# Patient Record
Sex: FEMALE | Race: WHITE | HISPANIC OR LATINO
[De-identification: names, ages, dates, MRNs, and addresses within clinical notes are randomized per-mention and may not be internally consistent; named-entity substitution may affect disease eponyms.]

---

## 2017-08-11 ENCOUNTER — RECORD ABSTRACTING (OUTPATIENT)
Age: 27
End: 2017-08-11

## 2017-08-11 DIAGNOSIS — Z36 ENCOUNTER FOR ANTENATAL SCREENING OF MOTHER: ICD-10-CM

## 2017-08-11 PROBLEM — Z00.00 ENCOUNTER FOR PREVENTIVE HEALTH EXAMINATION: Status: ACTIVE | Noted: 2017-08-11

## 2017-08-18 ENCOUNTER — APPOINTMENT (OUTPATIENT)
Dept: ANTEPARTUM | Facility: CLINIC | Age: 27
End: 2017-08-18
Payer: COMMERCIAL

## 2017-08-18 ENCOUNTER — ASOB RESULT (OUTPATIENT)
Age: 27
End: 2017-08-18

## 2017-08-18 PROCEDURE — 76817 TRANSVAGINAL US OBSTETRIC: CPT

## 2017-08-18 PROCEDURE — 76811 OB US DETAILED SNGL FETUS: CPT

## 2017-09-11 ENCOUNTER — TRANSCRIPTION ENCOUNTER (OUTPATIENT)
Age: 27
End: 2017-09-11

## 2017-11-14 ENCOUNTER — APPOINTMENT (OUTPATIENT)
Dept: ANTEPARTUM | Facility: CLINIC | Age: 27
End: 2017-11-14

## 2017-11-21 ENCOUNTER — ASOB RESULT (OUTPATIENT)
Age: 27
End: 2017-11-21

## 2017-11-21 ENCOUNTER — APPOINTMENT (OUTPATIENT)
Dept: ANTEPARTUM | Facility: CLINIC | Age: 27
End: 2017-11-21
Payer: COMMERCIAL

## 2017-11-21 PROCEDURE — 76816 OB US FOLLOW-UP PER FETUS: CPT

## 2018-01-04 ENCOUNTER — INPATIENT (INPATIENT)
Facility: HOSPITAL | Age: 28
LOS: 2 days | Discharge: HOME | End: 2018-01-07
Attending: SPECIALIST | Admitting: SPECIALIST
Payer: COMMERCIAL

## 2018-01-04 VITALS — WEIGHT: 189.6 LBS | HEIGHT: 63 IN

## 2018-01-04 DIAGNOSIS — O26.893 OTHER SPECIFIED PREGNANCY RELATED CONDITIONS, THIRD TRIMESTER: ICD-10-CM

## 2018-01-04 DIAGNOSIS — O47.1 FALSE LABOR AT OR AFTER 37 COMPLETED WEEKS OF GESTATION: ICD-10-CM

## 2018-01-04 LAB
APPEARANCE UR: CLEAR — SIGNIFICANT CHANGE UP
BASOPHILS # BLD AUTO: 0 K/UL — SIGNIFICANT CHANGE UP (ref 0–0.2)
BASOPHILS NFR BLD AUTO: 0.1 % — SIGNIFICANT CHANGE UP (ref 0–2)
BILIRUB UR-MCNC: NEGATIVE — SIGNIFICANT CHANGE UP
BLD GP AB SCN SERPL QL: SIGNIFICANT CHANGE UP
COLOR SPEC: SIGNIFICANT CHANGE UP
DIFF PNL FLD: NEGATIVE — SIGNIFICANT CHANGE UP
EOSINOPHIL # BLD AUTO: 0 K/UL — SIGNIFICANT CHANGE UP (ref 0–0.5)
EOSINOPHIL NFR BLD AUTO: 0.3 % — SIGNIFICANT CHANGE UP (ref 0–6)
GLUCOSE UR QL: NEGATIVE MG/DL — SIGNIFICANT CHANGE UP
HCT VFR BLD CALC: 37.2 % — SIGNIFICANT CHANGE UP (ref 37–47)
HGB BLD-MCNC: 12.2 G/DL — SIGNIFICANT CHANGE UP (ref 12–16)
KETONES UR-MCNC: NEGATIVE — SIGNIFICANT CHANGE UP
LEUKOCYTE ESTERASE UR-ACNC: NEGATIVE — SIGNIFICANT CHANGE UP
LYMPHOCYTES # BLD AUTO: 1.8 K/UL — SIGNIFICANT CHANGE UP (ref 1–4.8)
LYMPHOCYTES # BLD AUTO: 13.4 % — LOW (ref 20–55)
MCHC RBC-ENTMCNC: 28.8 PG — SIGNIFICANT CHANGE UP (ref 27–31)
MCHC RBC-ENTMCNC: 32.8 G/DL — SIGNIFICANT CHANGE UP (ref 32–36)
MCV RBC AUTO: 87.9 FL — SIGNIFICANT CHANGE UP (ref 81–99)
MONOCYTES # BLD AUTO: 1.3 K/UL — HIGH (ref 0–0.8)
MONOCYTES NFR BLD AUTO: 9.3 % — SIGNIFICANT CHANGE UP (ref 3–10)
NEUTROPHILS # BLD AUTO: 10.3 K/UL — HIGH (ref 1.8–8)
NEUTROPHILS NFR BLD AUTO: 76.1 % — HIGH (ref 37–73)
NITRITE UR-MCNC: NEGATIVE — SIGNIFICANT CHANGE UP
PH UR: 8 — SIGNIFICANT CHANGE UP (ref 5–8)
PLATELET # BLD AUTO: 259 K/UL — SIGNIFICANT CHANGE UP (ref 150–400)
PROT UR-MCNC: NEGATIVE MG/DL — SIGNIFICANT CHANGE UP
RBC # BLD: 4.23 M/UL — LOW (ref 4.4–5.2)
RBC # FLD: 13.6 % — SIGNIFICANT CHANGE UP (ref 11–15.6)
SP GR SPEC: 1.01 — SIGNIFICANT CHANGE UP (ref 1.01–1.02)
TYPE + AB SCN PNL BLD: SIGNIFICANT CHANGE UP
UROBILINOGEN FLD QL: NEGATIVE MG/DL — SIGNIFICANT CHANGE UP
WBC # BLD: 13.5 K/UL — HIGH (ref 4.8–10.8)
WBC # FLD AUTO: 13.5 K/UL — HIGH (ref 4.8–10.8)

## 2018-01-04 RX ORDER — PENICILLIN G POTASSIUM 5000000 [IU]/1
POWDER, FOR SOLUTION INTRAMUSCULAR; INTRAPLEURAL; INTRATHECAL; INTRAVENOUS
Qty: 0 | Refills: 0 | Status: DISCONTINUED | OUTPATIENT
Start: 2018-01-04 | End: 2018-01-05

## 2018-01-04 RX ORDER — SODIUM CHLORIDE 9 MG/ML
1000 INJECTION, SOLUTION INTRAVENOUS
Qty: 0 | Refills: 0 | Status: DISCONTINUED | OUTPATIENT
Start: 2018-01-04 | End: 2018-01-05

## 2018-01-04 RX ORDER — PENICILLIN G POTASSIUM 5000000 [IU]/1
5 POWDER, FOR SOLUTION INTRAMUSCULAR; INTRAPLEURAL; INTRATHECAL; INTRAVENOUS ONCE
Qty: 0 | Refills: 0 | Status: COMPLETED | OUTPATIENT
Start: 2018-01-04 | End: 2018-01-04

## 2018-01-04 RX ORDER — CITRIC ACID/SODIUM CITRATE 300-500 MG
30 SOLUTION, ORAL ORAL ONCE
Qty: 0 | Refills: 0 | Status: COMPLETED | OUTPATIENT
Start: 2018-01-04 | End: 2018-01-04

## 2018-01-04 RX ORDER — OXYTOCIN 10 UNIT/ML
333.33 VIAL (ML) INJECTION
Qty: 20 | Refills: 0 | Status: COMPLETED | OUTPATIENT
Start: 2018-01-04

## 2018-01-04 RX ORDER — PENICILLIN G POTASSIUM 5000000 [IU]/1
2.5 POWDER, FOR SOLUTION INTRAMUSCULAR; INTRAPLEURAL; INTRATHECAL; INTRAVENOUS EVERY 4 HOURS
Qty: 0 | Refills: 0 | Status: DISCONTINUED | OUTPATIENT
Start: 2018-01-04 | End: 2018-01-05

## 2018-01-04 RX ORDER — SODIUM CHLORIDE 9 MG/ML
1000 INJECTION, SOLUTION INTRAVENOUS ONCE
Qty: 0 | Refills: 0 | Status: COMPLETED | OUTPATIENT
Start: 2018-01-04 | End: 2018-01-04

## 2018-01-04 RX ADMIN — SODIUM CHLORIDE 125 MILLILITER(S): 9 INJECTION, SOLUTION INTRAVENOUS at 23:46

## 2018-01-04 RX ADMIN — PENICILLIN G POTASSIUM 200 MILLION UNIT(S): 5000000 POWDER, FOR SOLUTION INTRAMUSCULAR; INTRAPLEURAL; INTRATHECAL; INTRAVENOUS at 22:45

## 2018-01-04 RX ADMIN — SODIUM CHLORIDE 125 MILLILITER(S): 9 INJECTION, SOLUTION INTRAVENOUS at 18:45

## 2018-01-04 RX ADMIN — PENICILLIN G POTASSIUM 200 MILLION UNIT(S): 5000000 POWDER, FOR SOLUTION INTRAMUSCULAR; INTRAPLEURAL; INTRATHECAL; INTRAVENOUS at 18:45

## 2018-01-04 RX ADMIN — SODIUM CHLORIDE 2000 MILLILITER(S): 9 INJECTION, SOLUTION INTRAVENOUS at 17:45

## 2018-01-04 RX ADMIN — Medication 30 MILLILITER(S): at 19:35

## 2018-01-05 ENCOUNTER — TRANSCRIPTION ENCOUNTER (OUTPATIENT)
Age: 28
End: 2018-01-05

## 2018-01-05 LAB
BASE EXCESS BLDCOA CALC-SCNC: -2 MMOL/L — SIGNIFICANT CHANGE UP (ref -2–2)
BASE EXCESS BLDCOV CALC-SCNC: -0.1 MMOL/L — SIGNIFICANT CHANGE UP (ref -2–2)
BASOPHILS # BLD AUTO: 0 K/UL — SIGNIFICANT CHANGE UP (ref 0–0.2)
BASOPHILS NFR BLD AUTO: 0.1 % — SIGNIFICANT CHANGE UP (ref 0–2)
EOSINOPHIL # BLD AUTO: 0 K/UL — SIGNIFICANT CHANGE UP (ref 0–0.5)
EOSINOPHIL NFR BLD AUTO: 0.4 % — SIGNIFICANT CHANGE UP (ref 0–6)
GAS PNL BLDCOV: 7.37 — SIGNIFICANT CHANGE UP (ref 7.25–7.45)
HCO3 BLDCOA-SCNC: 21 MMOL/L — SIGNIFICANT CHANGE UP (ref 21–29)
HCO3 BLDCOV-SCNC: 24 MMOL/L — SIGNIFICANT CHANGE UP (ref 21–29)
HCT VFR BLD CALC: 33.7 % — LOW (ref 37–47)
HGB BLD-MCNC: 10.6 G/DL — LOW (ref 12–16)
LYMPHOCYTES # BLD AUTO: 1.6 K/UL — SIGNIFICANT CHANGE UP (ref 1–4.8)
LYMPHOCYTES # BLD AUTO: 12.8 % — LOW (ref 20–55)
MCHC RBC-ENTMCNC: 28.6 PG — SIGNIFICANT CHANGE UP (ref 27–31)
MCHC RBC-ENTMCNC: 31.5 G/DL — LOW (ref 32–36)
MCV RBC AUTO: 91.1 FL — SIGNIFICANT CHANGE UP (ref 81–99)
MONOCYTES # BLD AUTO: 1.6 K/UL — HIGH (ref 0–0.8)
MONOCYTES NFR BLD AUTO: 12.3 % — HIGH (ref 3–10)
NEUTROPHILS # BLD AUTO: 9.4 K/UL — HIGH (ref 1.8–8)
NEUTROPHILS NFR BLD AUTO: 73.8 % — HIGH (ref 37–73)
PCO2 BLDCOA: 50.4 MMHG — SIGNIFICANT CHANGE UP (ref 32–68)
PCO2 BLDCOV: 43.6 MMHG — SIGNIFICANT CHANGE UP (ref 29–53)
PH BLDCOA: 7.3 — SIGNIFICANT CHANGE UP (ref 7.18–7.38)
PLATELET # BLD AUTO: 214 K/UL — SIGNIFICANT CHANGE UP (ref 150–400)
PO2 BLDCOA: 16.8 MMHG — SIGNIFICANT CHANGE UP (ref 5.7–30.5)
PO2 BLDCOA: 28.3 MMHG — SIGNIFICANT CHANGE UP (ref 17–41)
RBC # BLD: 3.7 M/UL — LOW (ref 4.4–5.2)
RBC # FLD: 13.9 % — SIGNIFICANT CHANGE UP (ref 11–15.6)
SAO2 % BLDCOA: SIGNIFICANT CHANGE UP
SAO2 % BLDCOV: SIGNIFICANT CHANGE UP
T PALLIDUM AB TITR SER: NEGATIVE — SIGNIFICANT CHANGE UP
WBC # BLD: 12.7 K/UL — HIGH (ref 4.8–10.8)
WBC # FLD AUTO: 12.7 K/UL — HIGH (ref 4.8–10.8)

## 2018-01-05 RX ORDER — LANOLIN
1 OINTMENT (GRAM) TOPICAL EVERY 6 HOURS
Qty: 0 | Refills: 0 | Status: DISCONTINUED | OUTPATIENT
Start: 2018-01-05 | End: 2018-01-07

## 2018-01-05 RX ORDER — ACETAMINOPHEN 500 MG
650 TABLET ORAL EVERY 6 HOURS
Qty: 0 | Refills: 0 | Status: DISCONTINUED | OUTPATIENT
Start: 2018-01-05 | End: 2018-01-07

## 2018-01-05 RX ORDER — SODIUM CHLORIDE 9 MG/ML
3 INJECTION INTRAMUSCULAR; INTRAVENOUS; SUBCUTANEOUS EVERY 8 HOURS
Qty: 0 | Refills: 0 | Status: DISCONTINUED | OUTPATIENT
Start: 2018-01-05 | End: 2018-01-07

## 2018-01-05 RX ORDER — PRAMOXINE HYDROCHLORIDE 150 MG/15G
1 AEROSOL, FOAM RECTAL EVERY 4 HOURS
Qty: 0 | Refills: 0 | Status: DISCONTINUED | OUTPATIENT
Start: 2018-01-05 | End: 2018-01-07

## 2018-01-05 RX ORDER — SIMETHICONE 80 MG/1
80 TABLET, CHEWABLE ORAL EVERY 6 HOURS
Qty: 0 | Refills: 0 | Status: DISCONTINUED | OUTPATIENT
Start: 2018-01-05 | End: 2018-01-07

## 2018-01-05 RX ORDER — MAGNESIUM HYDROXIDE 400 MG/1
30 TABLET, CHEWABLE ORAL
Qty: 0 | Refills: 0 | Status: DISCONTINUED | OUTPATIENT
Start: 2018-01-05 | End: 2018-01-07

## 2018-01-05 RX ORDER — OXYTOCIN 10 UNIT/ML
41.67 VIAL (ML) INJECTION
Qty: 20 | Refills: 0 | Status: DISCONTINUED | OUTPATIENT
Start: 2018-01-05 | End: 2018-01-07

## 2018-01-05 RX ORDER — GLYCERIN ADULT
1 SUPPOSITORY, RECTAL RECTAL AT BEDTIME
Qty: 0 | Refills: 0 | Status: DISCONTINUED | OUTPATIENT
Start: 2018-01-05 | End: 2018-01-07

## 2018-01-05 RX ORDER — AER TRAVELER 0.5 G/1
1 SOLUTION RECTAL; TOPICAL EVERY 4 HOURS
Qty: 0 | Refills: 0 | Status: DISCONTINUED | OUTPATIENT
Start: 2018-01-05 | End: 2018-01-07

## 2018-01-05 RX ORDER — IBUPROFEN 200 MG
600 TABLET ORAL EVERY 6 HOURS
Qty: 0 | Refills: 0 | Status: DISCONTINUED | OUTPATIENT
Start: 2018-01-05 | End: 2018-01-07

## 2018-01-05 RX ORDER — DOCUSATE SODIUM 100 MG
100 CAPSULE ORAL
Qty: 0 | Refills: 0 | Status: DISCONTINUED | OUTPATIENT
Start: 2018-01-05 | End: 2018-01-07

## 2018-01-05 RX ORDER — HYDROCORTISONE 1 %
1 OINTMENT (GRAM) TOPICAL EVERY 4 HOURS
Qty: 0 | Refills: 0 | Status: DISCONTINUED | OUTPATIENT
Start: 2018-01-05 | End: 2018-01-07

## 2018-01-05 RX ORDER — DIBUCAINE 1 %
1 OINTMENT (GRAM) RECTAL EVERY 4 HOURS
Qty: 0 | Refills: 0 | Status: DISCONTINUED | OUTPATIENT
Start: 2018-01-05 | End: 2018-01-07

## 2018-01-05 RX ORDER — TETANUS TOXOID, REDUCED DIPHTHERIA TOXOID AND ACELLULAR PERTUSSIS VACCINE, ADSORBED 5; 2.5; 8; 8; 2.5 [IU]/.5ML; [IU]/.5ML; UG/.5ML; UG/.5ML; UG/.5ML
0.5 SUSPENSION INTRAMUSCULAR ONCE
Qty: 0 | Refills: 0 | Status: COMPLETED | OUTPATIENT
Start: 2018-01-05

## 2018-01-05 RX ORDER — OXYTOCIN 10 UNIT/ML
333.33 VIAL (ML) INJECTION
Qty: 20 | Refills: 0 | Status: DISCONTINUED | OUTPATIENT
Start: 2018-01-05 | End: 2018-01-07

## 2018-01-05 RX ORDER — DIPHENHYDRAMINE HCL 50 MG
25 CAPSULE ORAL EVERY 6 HOURS
Qty: 0 | Refills: 0 | Status: DISCONTINUED | OUTPATIENT
Start: 2018-01-05 | End: 2018-01-07

## 2018-01-05 RX ORDER — OXYCODONE AND ACETAMINOPHEN 5; 325 MG/1; MG/1
2 TABLET ORAL EVERY 6 HOURS
Qty: 0 | Refills: 0 | Status: DISCONTINUED | OUTPATIENT
Start: 2018-01-05 | End: 2018-01-07

## 2018-01-05 RX ADMIN — OXYCODONE AND ACETAMINOPHEN 2 TABLET(S): 5; 325 TABLET ORAL at 12:38

## 2018-01-05 RX ADMIN — Medication 600 MILLIGRAM(S): at 12:05

## 2018-01-05 RX ADMIN — OXYCODONE AND ACETAMINOPHEN 2 TABLET(S): 5; 325 TABLET ORAL at 13:38

## 2018-01-05 RX ADMIN — Medication 600 MILLIGRAM(S): at 16:53

## 2018-01-05 RX ADMIN — Medication 1000 MILLIUNIT(S)/MIN: at 03:47

## 2018-01-05 RX ADMIN — Medication 600 MILLIGRAM(S): at 04:17

## 2018-01-05 RX ADMIN — OXYCODONE AND ACETAMINOPHEN 2 TABLET(S): 5; 325 TABLET ORAL at 21:50

## 2018-01-05 RX ADMIN — Medication 125 MILLIUNIT(S)/MIN: at 03:47

## 2018-01-05 RX ADMIN — OXYCODONE AND ACETAMINOPHEN 2 TABLET(S): 5; 325 TABLET ORAL at 20:49

## 2018-01-05 RX ADMIN — Medication 600 MILLIGRAM(S): at 11:05

## 2018-01-05 RX ADMIN — Medication 1 TABLET(S): at 11:05

## 2018-01-05 RX ADMIN — Medication 600 MILLIGRAM(S): at 17:53

## 2018-01-05 RX ADMIN — Medication 600 MILLIGRAM(S): at 04:55

## 2018-01-05 NOTE — DISCHARGE NOTE OB - CARE PROVIDER_API CALL
Sheela Isidro (MD), [facility] Ob[facility]etrics [facility] Gyne[facility]  [facility] [place] [address]  Phone: [phone]  Fax: [phone]

## 2018-01-05 NOTE — DISCHARGE NOTE OB - PLAN OF CARE
Rapid recovery Patient should transition to regular activity level. Resume regular diet. Patient should follow up with her OB for a postpartum checkup 6 weeks after delivery. Patient should call her doctor sooner if she develops a fever or uncontrolled vaginal bleeding or fevers. Please call sooner if there are any other concerns.

## 2018-01-05 NOTE — DISCHARGE NOTE OB - PATIENT PORTAL LINK FT
“You can access the FollowHealth Patient Portal, offered by [facility], by registering with the following website: [email or web address]”

## 2018-01-05 NOTE — DISCHARGE NOTE OB - MEDICATION SUMMARY - MEDICATIONS TO TAKE
I will START or STAY ON the medications listed below when I get home from the hospital:  None I will START or STAY ON the medications listed below when I get home from the hospital:    ibuprofen 600 mg oral tablet  -- 1 tab(s) by mouth every 6 hours, As needed, Moderate Pain  -- Indication: For moderate pain    Prenatal Multivitamins with Folic Acid 1 mg oral tablet  -- 1 tab(s) by mouth once a day  -- Indication: For home med

## 2018-01-05 NOTE — DISCHARGE NOTE OB - CARE PLAN
Principal Discharge DX:	 (normal spontaneous vaginal delivery)  Goal:	Rapid recovery  Instructions for follow-up, activity and diet:	Patient should transition to regular activity level. Resume regular diet. Patient should follow up with her OB for a postpartum checkup 6 weeks after delivery. Patient should call her doctor sooner if she develops a fever or uncontrolled vaginal bleeding or fevers. Please call sooner if there are any other concerns.

## 2018-01-06 RX ORDER — TETANUS TOXOID, REDUCED DIPHTHERIA TOXOID AND ACELLULAR PERTUSSIS VACCINE, ADSORBED 5; 2.5; 8; 8; 2.5 [IU]/.5ML; [IU]/.5ML; UG/.5ML; UG/.5ML; UG/.5ML
0.5 SUSPENSION INTRAMUSCULAR ONCE
Qty: 0 | Refills: 0 | Status: COMPLETED | OUTPATIENT
Start: 2018-01-06 | End: 2018-01-06

## 2018-01-06 RX ORDER — IBUPROFEN 200 MG
1 TABLET ORAL
Qty: 0 | Refills: 0 | DISCHARGE
Start: 2018-01-06

## 2018-01-06 RX ADMIN — Medication 600 MILLIGRAM(S): at 10:41

## 2018-01-06 RX ADMIN — OXYCODONE AND ACETAMINOPHEN 2 TABLET(S): 5; 325 TABLET ORAL at 06:00

## 2018-01-06 RX ADMIN — Medication 100 MILLIGRAM(S): at 16:26

## 2018-01-06 RX ADMIN — Medication 600 MILLIGRAM(S): at 16:26

## 2018-01-06 RX ADMIN — Medication 600 MILLIGRAM(S): at 22:55

## 2018-01-06 RX ADMIN — Medication 600 MILLIGRAM(S): at 17:21

## 2018-01-06 RX ADMIN — Medication 1 TABLET(S): at 10:41

## 2018-01-06 RX ADMIN — TETANUS TOXOID, REDUCED DIPHTHERIA TOXOID AND ACELLULAR PERTUSSIS VACCINE, ADSORBED 0.5 MILLILITER(S): 5; 2.5; 8; 8; 2.5 SUSPENSION INTRAMUSCULAR at 20:01

## 2018-01-06 RX ADMIN — Medication 600 MILLIGRAM(S): at 11:26

## 2018-01-06 RX ADMIN — OXYCODONE AND ACETAMINOPHEN 2 TABLET(S): 5; 325 TABLET ORAL at 04:56

## 2018-01-07 VITALS — SYSTOLIC BLOOD PRESSURE: 120 MMHG | RESPIRATION RATE: 22 BRPM | DIASTOLIC BLOOD PRESSURE: 65 MMHG | TEMPERATURE: 98 F

## 2018-01-07 PROCEDURE — 86850 RBC ANTIBODY SCREEN: CPT

## 2018-01-07 PROCEDURE — 86900 BLOOD TYPING SEROLOGIC ABO: CPT

## 2018-01-07 PROCEDURE — 86780 TREPONEMA PALLIDUM: CPT

## 2018-01-07 PROCEDURE — 81003 URINALYSIS AUTO W/O SCOPE: CPT

## 2018-01-07 PROCEDURE — 59025 FETAL NON-STRESS TEST: CPT

## 2018-01-07 PROCEDURE — 82803 BLOOD GASES ANY COMBINATION: CPT

## 2018-01-07 PROCEDURE — 86901 BLOOD TYPING SEROLOGIC RH(D): CPT

## 2018-01-07 PROCEDURE — 59050 FETAL MONITOR W/REPORT: CPT

## 2018-01-07 PROCEDURE — 85027 COMPLETE CBC AUTOMATED: CPT

## 2018-01-07 PROCEDURE — 90715 TDAP VACCINE 7 YRS/> IM: CPT

## 2018-01-07 PROCEDURE — 36415 COLL VENOUS BLD VENIPUNCTURE: CPT

## 2018-01-07 PROCEDURE — G0463: CPT

## 2018-01-07 RX ADMIN — Medication 600 MILLIGRAM(S): at 06:53

## 2018-01-07 RX ADMIN — Medication 600 MILLIGRAM(S): at 00:00

## 2018-01-07 RX ADMIN — Medication 600 MILLIGRAM(S): at 05:26

## 2018-01-07 RX ADMIN — OXYCODONE AND ACETAMINOPHEN 2 TABLET(S): 5; 325 TABLET ORAL at 09:20

## 2018-01-07 RX ADMIN — OXYCODONE AND ACETAMINOPHEN 2 TABLET(S): 5; 325 TABLET ORAL at 08:29

## 2018-01-07 NOTE — PROGRESS NOTE ADULT - ASSESSMENT
A/P:  PPD#1 s/p    -Stable  -Voiding, tolerating PO, bowel function nml   -Advance care as tolerated   -Continue routine postpartum/postoperative care and education.
A/P:  PPD#2 s/p    -Stable, home today   -Voiding, tolerating PO, bowel function nml   -Advance care as tolerated   -Continue routine postpartum/postoperative care and education.

## 2018-01-07 NOTE — PROGRESS NOTE ADULT - SUBJECTIVE AND OBJECTIVE BOX
PPD#1 s/p spontaneous vaginal delivery     S:    The patient has no complaints.  She is ambulating and tolerating diet   + flatus/-BM     O:    T(C): 36.4 (01-05-18 @ 20:50), Max: 36.4 (01-05-18 @ 20:50)  HR: 74 (01-05-18 @ 20:50) (73 - 74)  BP: 102/59 (01-05-18 @ 20:50) (102/59 - 119/78)  RR: 18 (01-05-18 @ 20:50) (18 - 20)  Abdomen:  soft, non-tender, non-distended, +bowel sounds.  Uterus:  Fundus firm below umbilicus  VE:  +lochia  Ext:  Non-tender.                          10.6   12.7  )-----------( 214      ( 05 Jan 2018 18:37 )             33.7
  PPD#2    Pt is now PPD #2 doing well.    She is ambulating without difficulty, tolerating a reg diet, and denies N/V.  No C/O dizziness, no RITO.  +Voiding without difficulty.      On exam, pt appears well.  VSS, afebrile.    Vital Signs Last 24 Hrs  T(C): 36.9 (2018 08:49), Max: 37 (2018 20:33)  T(F): 98.4 (2018 08:49), Max: 98.6 (2018 20:33)  HR: 81 (2018 20:33) (81 - 81)  BP: 120/65 (2018 08:49) (101/55 - 120/65)  RR: 22 (2018 08:49) (20 - 22)    Breasts soft, nontender, nonengorged.  Abdomen: Soft, nontender, nondistended , firm uterine fundus.  Pelvic: Normal lochia noted  Ext: No DVT tenderness    LABS:                        10.6   12.7  )-----------( 214      ( 2018 18:37 )             33.7                     PPD#2 S/P  stable.  Pt recovering well.  Signs and symptoms normal /  abnormal post partum reviewed.  Plan to cont reg diet, OOB,  pelvic rest, Motrin/Percocet prn.  OK for D/C home.  F/U in office in 6 wks.
INTERVAL HPI/OVERNIGHT EVENTS:  27y Female s/p labor epidural on 1/6/18    Vital Signs Last 24 Hrs  T(C): 36.7 (06 Jan 2018 10:07), Max: 36.7 (06 Jan 2018 10:07)  T(F): 98.1 (06 Jan 2018 10:07), Max: 98.1 (06 Jan 2018 10:07)  HR: 62 (06 Jan 2018 10:07) (62 - 74)  BP: 107/59 (06 Jan 2018 10:07) (102/59 - 111/69)  BP(mean): --  RR: 18 (06 Jan 2018 10:07) (18 - 18)  SpO2: --    Patient seen, doing well, no anesthetic complications or complaints noted or reported.
PPD#2 s/p spontaneous vaginal delivery     S:    The patient has no complaints.  She is ambulating and tolerating diet   + flatus/-BM     O:    T(C): 37 (01-06-18 @ 20:33), Max: 37 (01-06-18 @ 20:33)  HR: 81 (01-06-18 @ 20:33) (62 - 81)  BP: 101/55 (01-06-18 @ 20:33) (101/55 - 111/69)  RR: 20 (01-06-18 @ 20:33) (18 - 20)  Abdomen:  soft, non-tender, non-distended, +bowel sounds.  Uterus:  Fundus firm below umbilicus  VE:  +lochia  Ext:  Non-tender.                          10.6   12.7  )-----------( 214      ( 05 Jan 2018 18:37 )             33.7

## 2018-01-09 ENCOUNTER — APPOINTMENT (OUTPATIENT)
Dept: ANTEPARTUM | Facility: CLINIC | Age: 28
End: 2018-01-09

## 2018-10-07 ENCOUNTER — EMERGENCY (EMERGENCY)
Facility: HOSPITAL | Age: 28
LOS: 1 days | Discharge: HOME | End: 2018-10-07
Attending: EMERGENCY MEDICINE
Payer: COMMERCIAL

## 2018-10-07 ENCOUNTER — TRANSCRIPTION ENCOUNTER (OUTPATIENT)
Age: 28
End: 2018-10-07

## 2018-10-07 VITALS
RESPIRATION RATE: 20 BRPM | OXYGEN SATURATION: 98 % | HEART RATE: 59 BPM | TEMPERATURE: 99 F | DIASTOLIC BLOOD PRESSURE: 75 MMHG | WEIGHT: 149.91 LBS | SYSTOLIC BLOOD PRESSURE: 107 MMHG | HEIGHT: 63 IN

## 2018-10-07 LAB
ALBUMIN SERPL ELPH-MCNC: 4.1 G/DL — SIGNIFICANT CHANGE UP (ref 3.3–5.2)
ALP SERPL-CCNC: 67 U/L — SIGNIFICANT CHANGE UP (ref 40–120)
ALT FLD-CCNC: 80 U/L — HIGH
ANION GAP SERPL CALC-SCNC: 8 MMOL/L — SIGNIFICANT CHANGE UP (ref 5–17)
APPEARANCE UR: CLEAR — SIGNIFICANT CHANGE UP
AST SERPL-CCNC: 91 U/L — HIGH
BASOPHILS # BLD AUTO: 0 K/UL — SIGNIFICANT CHANGE UP (ref 0–0.2)
BASOPHILS NFR BLD AUTO: 0.3 % — SIGNIFICANT CHANGE UP (ref 0–2)
BILIRUB SERPL-MCNC: 0.3 MG/DL — LOW (ref 0.4–2)
BILIRUB UR-MCNC: NEGATIVE — SIGNIFICANT CHANGE UP
BUN SERPL-MCNC: 9 MG/DL — SIGNIFICANT CHANGE UP (ref 8–20)
CALCIUM SERPL-MCNC: 8.9 MG/DL — SIGNIFICANT CHANGE UP (ref 8.6–10.2)
CHLORIDE SERPL-SCNC: 103 MMOL/L — SIGNIFICANT CHANGE UP (ref 98–107)
CO2 SERPL-SCNC: 27 MMOL/L — SIGNIFICANT CHANGE UP (ref 22–29)
COLOR SPEC: YELLOW — SIGNIFICANT CHANGE UP
CREAT SERPL-MCNC: 0.76 MG/DL — SIGNIFICANT CHANGE UP (ref 0.5–1.3)
DIFF PNL FLD: NEGATIVE — SIGNIFICANT CHANGE UP
EOSINOPHIL # BLD AUTO: 0 K/UL — SIGNIFICANT CHANGE UP (ref 0–0.5)
EOSINOPHIL NFR BLD AUTO: 0 % — SIGNIFICANT CHANGE UP (ref 0–6)
GLUCOSE SERPL-MCNC: 85 MG/DL — SIGNIFICANT CHANGE UP (ref 70–115)
GLUCOSE UR QL: NEGATIVE MG/DL — SIGNIFICANT CHANGE UP
HCG UR QL: NEGATIVE — SIGNIFICANT CHANGE UP
HCT VFR BLD CALC: 43.9 % — SIGNIFICANT CHANGE UP (ref 37–47)
HGB BLD-MCNC: 13.4 G/DL — SIGNIFICANT CHANGE UP (ref 12–16)
KETONES UR-MCNC: NEGATIVE — SIGNIFICANT CHANGE UP
LEUKOCYTE ESTERASE UR-ACNC: NEGATIVE — SIGNIFICANT CHANGE UP
LIDOCAIN IGE QN: 24 U/L — SIGNIFICANT CHANGE UP (ref 22–51)
LYMPHOCYTES # BLD AUTO: 0.7 K/UL — LOW (ref 1–4.8)
LYMPHOCYTES # BLD AUTO: 23.4 % — SIGNIFICANT CHANGE UP (ref 20–55)
MCHC RBC-ENTMCNC: 28.7 PG — SIGNIFICANT CHANGE UP (ref 27–31)
MCHC RBC-ENTMCNC: 30.5 G/DL — LOW (ref 32–36)
MCV RBC AUTO: 94 FL — SIGNIFICANT CHANGE UP (ref 81–99)
MONOCYTES # BLD AUTO: 0.5 K/UL — SIGNIFICANT CHANGE UP (ref 0–0.8)
MONOCYTES NFR BLD AUTO: 17.2 % — HIGH (ref 3–10)
NEUTROPHILS # BLD AUTO: 1.7 K/UL — LOW (ref 1.8–8)
NEUTROPHILS NFR BLD AUTO: 58.8 % — SIGNIFICANT CHANGE UP (ref 37–73)
NITRITE UR-MCNC: NEGATIVE — SIGNIFICANT CHANGE UP
PH UR: 6.5 — SIGNIFICANT CHANGE UP (ref 5–8)
PLATELET # BLD AUTO: 165 K/UL — SIGNIFICANT CHANGE UP (ref 150–400)
POTASSIUM SERPL-MCNC: 4.4 MMOL/L — SIGNIFICANT CHANGE UP (ref 3.5–5.3)
POTASSIUM SERPL-SCNC: 4.4 MMOL/L — SIGNIFICANT CHANGE UP (ref 3.5–5.3)
PROT SERPL-MCNC: 6.9 G/DL — SIGNIFICANT CHANGE UP (ref 6.6–8.7)
PROT UR-MCNC: 15 MG/DL
RBC # BLD: 4.67 M/UL — SIGNIFICANT CHANGE UP (ref 4.4–5.2)
RBC # FLD: 13.1 % — SIGNIFICANT CHANGE UP (ref 11–15.6)
SODIUM SERPL-SCNC: 138 MMOL/L — SIGNIFICANT CHANGE UP (ref 135–145)
SP GR SPEC: 1.01 — SIGNIFICANT CHANGE UP (ref 1.01–1.02)
UROBILINOGEN FLD QL: NEGATIVE MG/DL — SIGNIFICANT CHANGE UP
WBC # BLD: 2.9 K/UL — LOW (ref 4.8–10.8)
WBC # FLD AUTO: 2.9 K/UL — LOW (ref 4.8–10.8)

## 2018-10-07 PROCEDURE — 96374 THER/PROPH/DIAG INJ IV PUSH: CPT

## 2018-10-07 PROCEDURE — 99284 EMERGENCY DEPT VISIT MOD MDM: CPT

## 2018-10-07 PROCEDURE — 96375 TX/PRO/DX INJ NEW DRUG ADDON: CPT

## 2018-10-07 PROCEDURE — 81001 URINALYSIS AUTO W/SCOPE: CPT

## 2018-10-07 PROCEDURE — 81025 URINE PREGNANCY TEST: CPT

## 2018-10-07 PROCEDURE — 80053 COMPREHEN METABOLIC PANEL: CPT

## 2018-10-07 PROCEDURE — 76705 ECHO EXAM OF ABDOMEN: CPT | Mod: 26

## 2018-10-07 PROCEDURE — 83690 ASSAY OF LIPASE: CPT

## 2018-10-07 PROCEDURE — 85027 COMPLETE CBC AUTOMATED: CPT

## 2018-10-07 PROCEDURE — 99284 EMERGENCY DEPT VISIT MOD MDM: CPT | Mod: 25

## 2018-10-07 PROCEDURE — 36415 COLL VENOUS BLD VENIPUNCTURE: CPT

## 2018-10-07 PROCEDURE — 76705 ECHO EXAM OF ABDOMEN: CPT

## 2018-10-07 RX ORDER — IBUPROFEN 200 MG
600 TABLET ORAL ONCE
Qty: 0 | Refills: 0 | Status: COMPLETED | OUTPATIENT
Start: 2018-10-07 | End: 2018-10-07

## 2018-10-07 RX ORDER — SODIUM CHLORIDE 9 MG/ML
1000 INJECTION INTRAMUSCULAR; INTRAVENOUS; SUBCUTANEOUS ONCE
Qty: 0 | Refills: 0 | Status: COMPLETED | OUTPATIENT
Start: 2018-10-07 | End: 2018-10-07

## 2018-10-07 RX ORDER — ONDANSETRON 8 MG/1
4 TABLET, FILM COATED ORAL ONCE
Qty: 0 | Refills: 0 | Status: COMPLETED | OUTPATIENT
Start: 2018-10-07 | End: 2018-10-07

## 2018-10-07 RX ORDER — FAMOTIDINE 10 MG/ML
20 INJECTION INTRAVENOUS ONCE
Qty: 0 | Refills: 0 | Status: COMPLETED | OUTPATIENT
Start: 2018-10-07 | End: 2018-10-07

## 2018-10-07 RX ORDER — LIDOCAINE 4 G/100G
10 CREAM TOPICAL ONCE
Qty: 0 | Refills: 0 | Status: COMPLETED | OUTPATIENT
Start: 2018-10-07 | End: 2018-10-07

## 2018-10-07 RX ORDER — ONDANSETRON 8 MG/1
1 TABLET, FILM COATED ORAL
Qty: 12 | Refills: 0
Start: 2018-10-07 | End: 2018-10-10

## 2018-10-07 RX ADMIN — ONDANSETRON 4 MILLIGRAM(S): 8 TABLET, FILM COATED ORAL at 12:15

## 2018-10-07 RX ADMIN — SODIUM CHLORIDE 1000 MILLILITER(S): 9 INJECTION INTRAMUSCULAR; INTRAVENOUS; SUBCUTANEOUS at 12:15

## 2018-10-07 RX ADMIN — Medication 30 MILLILITER(S): at 12:15

## 2018-10-07 RX ADMIN — LIDOCAINE 10 MILLILITER(S): 4 CREAM TOPICAL at 12:15

## 2018-10-07 RX ADMIN — Medication 600 MILLIGRAM(S): at 14:46

## 2018-10-07 RX ADMIN — FAMOTIDINE 20 MILLIGRAM(S): 10 INJECTION INTRAVENOUS at 12:15

## 2018-10-07 NOTE — ED PROVIDER NOTE - MEDICAL DECISION MAKING DETAILS
29 y/o with no past medical history with viral symptoms for 3 days. Negative flu now with epigastric. Normal vitals; likely gastritis. Low suspicion for pancreatitis, cholelithiasis or cholecystitis. Will check labs, treat symptoms and reassess.

## 2018-10-07 NOTE — ED PROVIDER NOTE - PROGRESS NOTE DETAILS
patient with leukopenia, and monocyte predominance. likely viral illness. possibly Mono. mild elevation LFT no elevation bili or alk phos has cholelithiaisis no cholecystitis. will have Follow up outpt with PCP and gen surgery as needed. symptoms likely related to viral illness -Aissatou DO

## 2018-10-07 NOTE — ED PROVIDER NOTE - NS ED ROS FT
ROS: no CP/SOB. no cough. no fever. no n/v/d/c.  +abd pain. no rash. no bleeding. no urinary complaints. no weakness. no vision changes. no HA. no neck/back pain. no extremity swelling/deformity. No change in mental status. ROS: no CP/SOB. no cough. no fever. +nausea no v/d/c.  +abd pain. no rash. no bleeding. no urinary complaints. no weakness. no vision changes. no HA. no neck/back pain. no extremity swelling/deformity. No change in mental status.

## 2018-10-07 NOTE — ED PROVIDER NOTE - NS_ ATTENDINGSCRIBEDETAILS _ED_A_ED_FT
I, Tara Reyez, performed the initial face to face bedside interview with this patient regarding history of present illness, review of symptoms and relevant past medical, social and family history.  I completed an independent physical examination.  I was the initial provider who evaluated this patient.   The history, relevant review of systems, past medical and surgical history, medical decision making, and physical examination was documented by the scribe in my presence and I attest to the accuracy of the documentation.

## 2018-10-07 NOTE — ED PROVIDER NOTE - PHYSICAL EXAMINATION
Gen: NAD, AOx3  Head: NCAT  HEENT: EOMI, oral mucosa moist, normal conjunctiva, neck supple  Lung: CTAB, no respiratory distress  CV: rrr, no murmur, Normal perfusion  Abd: soft, +epigastric tenderness, no rebound no guarding, no Montano's sign, no CVA tenderness  MSK: No edema, no visible deformities  Neuro: No focal neurologic deficits  Skin: No rash   Psych: normal affect

## 2018-10-07 NOTE — ED ADULT NURSE NOTE - OBJECTIVE STATEMENT
29 y/o female presents to the ed c/o abdominal pain and discomfort. pt is awake alert oriented following commands and speaking coherently. presents stating she is nauseous. denies fever chills chest pain sob headache and urinary problems.

## 2018-10-07 NOTE — ED PROVIDER NOTE - SEVERE SEPSIS ALERT DETAILS
Pt does not fit sepsis criteria, likely viral illness. normal vital signs. no fever. patient with leukopenia- likely viral suppression

## 2018-10-07 NOTE — ED PROVIDER NOTE - PLAN OF CARE
1. Return to ED for worsening, progressive or any other concerning symptoms   2. Follow up with your primary care doctor in 2-3days  3. Take motrin 600mg every 6 hours as needed for pain and Take Tylenol up to 650 mg every 6 hours as needed for pain.   4. Take 4mg Zofran every 6-8 hours as needed for nausea

## 2018-10-07 NOTE — ED PROVIDER NOTE - CARE PLAN
Principal Discharge DX:	Epigastric pain Principal Discharge DX:	Epigastric pain  Assessment and plan of treatment:	1. Return to ED for worsening, progressive or any other concerning symptoms   2. Follow up with your primary care doctor in 2-3days  3. Take motrin 600mg every 6 hours as needed for pain and Take Tylenol up to 650 mg every 6 hours as needed for pain.   4. Take 4mg Zofran every 6-8 hours as needed for nausea  Secondary Diagnosis:	Viral infection

## 2018-10-07 NOTE — ED PROVIDER NOTE - OBJECTIVE STATEMENT
29 y/o F pt presents to ED c/o ABD pain and body aches. She indicates she developed body aches Thursday, which temporarily subsided yesterday, but developed severe ABD pain and chills last night. Denies nausea, vomiting, diarrhea or urinary complaints. Last BM was yesterday and was reportedly normal. She describes the pain as a sharp pain. Indicates occasional ETOH consumption. No further complaints at this time. 29 y/o F pt presents to ED c/o ABD pain and body aches. She indicates she developed body aches Thursday, which temporarily subsided yesterday, but developed severe ABD pain and chills last night. Denies nausea, vomiting, diarrhea or urinary complaints. Last BM was yesterday and was reportedly normal. She describes the pain as a sharp pain. non radiating. Indicates occasional ETOH consumption. No further complaints at this time.

## 2018-10-07 NOTE — ED STATDOCS - OBJECTIVE STATEMENT
Telemedicine assessment was conducted (using real time 2 way audio-video technology) by Dr. Gilberto Nicole located at [address] [place] [id number]  ++++++++++++++++++++++++  Pertinent patient history and initial plan:  27 yo female with no pmhx c/o abdominal pain x 1 day. patient reports subjective fever and chills and myalgia x few days, which is now improving. no vomiting or diarrhea. c/o upper abdominal pain. denies lower abdominal pain or tenderness. lmp x 3 weeks ago. denies urinary symptoms  patient went to [facility] urgent care and had negative pregnancy, negative flu, and then referred to ED to oniel out "abdominal process"    will start with labs, UA as exam needed to decide if further testing necessary    Patient seen by me in intake for initial assessment and ordering. Physician on site to follow results and further evaluate and treat patient.

## 2018-10-07 NOTE — ED ADULT NURSE NOTE - NSIMPLEMENTINTERV_GEN_ALL_ED
Implemented All Universal Safety Interventions:  [place] to call system. Call bell, personal items and telephone within reach. Instruct patient to call for assistance. Room bathroom lighting operational. Non-slip footwear when patient is off stretcher. Physically safe environment: no spills, clutter or unnecessary equipment. Stretcher in lowest position, wheels locked, appropriate side rails in place.

## 2018-10-07 NOTE — ED ADULT TRIAGE NOTE - CHIEF COMPLAINT QUOTE
abd pain x 1 day and sent from urgent care for CT scan. no n/v, no diarrhea, has back pain, chills. no urinary s/s

## 2018-11-01 ENCOUNTER — APPOINTMENT (OUTPATIENT)
Dept: TRAUMA SURGERY | Facility: CLINIC | Age: 28
End: 2018-11-01
Payer: COMMERCIAL

## 2018-11-01 VITALS
BODY MASS INDEX: 27.82 KG/M2 | OXYGEN SATURATION: 98 % | HEART RATE: 41 BPM | HEIGHT: 63 IN | DIASTOLIC BLOOD PRESSURE: 61 MMHG | TEMPERATURE: 98.7 F | SYSTOLIC BLOOD PRESSURE: 97 MMHG | WEIGHT: 157 LBS

## 2018-11-01 DIAGNOSIS — Z83.49 FAMILY HISTORY OF OTHER ENDOCRINE, NUTRITIONAL AND METABOLIC DISEASES: ICD-10-CM

## 2018-11-01 DIAGNOSIS — Z82.49 FAMILY HISTORY OF ISCHEMIC HEART DISEASE AND OTHER DISEASES OF THE CIRCULATORY SYSTEM: ICD-10-CM

## 2018-11-01 DIAGNOSIS — Z83.2 FAMILY HISTORY OF DISEASES OF THE BLOOD AND BLOOD-FORMING ORGANS AND CERTAIN DISORDERS INVOLVING THE IMMUNE MECHANISM: ICD-10-CM

## 2018-11-01 DIAGNOSIS — Z83.3 FAMILY HISTORY OF DIABETES MELLITUS: ICD-10-CM

## 2018-11-01 DIAGNOSIS — Z80.0 FAMILY HISTORY OF MALIGNANT NEOPLASM OF DIGESTIVE ORGANS: ICD-10-CM

## 2018-11-01 DIAGNOSIS — R10.13 EPIGASTRIC PAIN: ICD-10-CM

## 2018-11-01 DIAGNOSIS — Z78.9 OTHER SPECIFIED HEALTH STATUS: ICD-10-CM

## 2018-11-01 DIAGNOSIS — Z83.42 FAMILY HISTORY OF FAMILIAL HYPERCHOLESTEROLEMIA: ICD-10-CM

## 2018-11-01 DIAGNOSIS — Z82.61 FAMILY HISTORY OF ARTHRITIS: ICD-10-CM

## 2018-11-01 PROCEDURE — 99214 OFFICE O/P EST MOD 30 MIN: CPT

## 2018-11-01 RX ORDER — ONDANSETRON 4 MG/1
4 TABLET, ORALLY DISINTEGRATING ORAL
Qty: 12 | Refills: 0 | Status: ACTIVE | COMMUNITY
Start: 2018-10-07

## 2018-12-06 ENCOUNTER — APPOINTMENT (OUTPATIENT)
Dept: TRAUMA SURGERY | Facility: CLINIC | Age: 28
End: 2018-12-06

## 2019-03-19 ENCOUNTER — ASOB RESULT (OUTPATIENT)
Age: 29
End: 2019-03-19

## 2019-03-19 ENCOUNTER — APPOINTMENT (OUTPATIENT)
Age: 29
End: 2019-03-19
Payer: COMMERCIAL

## 2019-03-19 PROCEDURE — 76830 TRANSVAGINAL US NON-OB: CPT

## 2019-03-19 PROCEDURE — 76857 US EXAM PELVIC LIMITED: CPT | Mod: 59

## 2020-04-08 NOTE — PROGRESS NOTE ADULT - PROBLEM SELECTOR PLAN 1
Tobacco Treatment Program at the [facility] attempted to reach Ms. Koo on 9/3/2019 regarding the tobacco cessation program to help Ms. Koo to quit smoking. We will attempt to reach Ms. Koo another time.     [facility]  Tobacco Treatment Specialist  PH: [phone]      
Tobacco Treatment Program at the [facility] attempted to reach Ms. Koo on 9/9/2019 regarding the tobacco cessation program to help Ms. Koo to quit smoking. We will attempt to reach Ms. Koo another time.     [facility]  Tobacco Treatment Specialist  PH: [phone]      
Tobacco Treatment Program at the [facility] attempted to reach Ms. Koo on 9/17/2019 regarding the tobacco cessation program to help Ms. Koo to quit smoking. We will attempt to reach Ms. Koo another time.     [facility]  Tobacco Treatment Specialist  PH: [phone]      
Tobacco Treatment Program at the [facility] attempted to reach Ms. Koo on 4/8/2020 regarding the tobacco cessation program to help Ms. Koo to quit smoking. We will attempt to reach Ms. Koo another time.     [facility]  Tobacco Treatment Specialist  PH: [phone]      
see above
see above

## 2021-01-15 ENCOUNTER — EMERGENCY (EMERGENCY)
Facility: HOSPITAL | Age: 31
LOS: 1 days | Discharge: HOME | End: 2021-01-15
Attending: EMERGENCY MEDICINE
Payer: COMMERCIAL

## 2021-01-15 VITALS
HEART RATE: 60 BPM | OXYGEN SATURATION: 98 % | TEMPERATURE: 99 F | SYSTOLIC BLOOD PRESSURE: 105 MMHG | DIASTOLIC BLOOD PRESSURE: 67 MMHG | RESPIRATION RATE: 16 BRPM

## 2021-01-15 VITALS — HEIGHT: 63 IN

## 2021-01-15 PROCEDURE — 71045 X-RAY EXAM CHEST 1 VIEW: CPT | Mod: 26

## 2021-01-15 PROCEDURE — 93005 ELECTROCARDIOGRAM TRACING: CPT

## 2021-01-15 PROCEDURE — 93010 ELECTROCARDIOGRAM REPORT: CPT

## 2021-01-15 PROCEDURE — 71045 X-RAY EXAM CHEST 1 VIEW: CPT

## 2021-01-15 PROCEDURE — 99284 EMERGENCY DEPT VISIT MOD MDM: CPT

## 2021-01-15 PROCEDURE — 99283 EMERGENCY DEPT VISIT LOW MDM: CPT | Mod: 25

## 2021-01-15 NOTE — ED PROVIDER NOTE - OBJECTIVE STATEMENT
Patient is a 30 year old female who presents c/o mid chest pain, cough since Wed. patient states feels like a pressure with SOB. patient went to UC today and tested positive for covid.  positive chills, no fevers, no abdominal pain, no n/v/d.  patient went to UC today, tested positive for covid and sent to ER. no calf pain

## 2021-01-15 NOTE — ED PROVIDER NOTE - PATIENT PORTAL LINK FT
You can access the FollowMyHealth Patient Portal offered by [facility] by registering at the following website: [email or web address]. By joining [employer]’s FollowMyHealth portal, you will also be able to view your health information using other applications (apps) compatible with our system.

## 2021-01-15 NOTE — ED PROVIDER NOTE - ATTENDING CONTRIBUTION TO CARE
31 yo F sent from  for chest pain in the setting of +covid.   VSS. no hypoxia at rest or exertion  lungs ctabl, sfs.  no FND  ekg nsr @ 59bpm. no kavitha/std/twi  low suspicion for pe, acs.   covid instructions, isolation precautions. pulse ox instructions. DC with pmd f/u.  Based on the H&P, I do not suspect any life- / limb- threatening pathology that requires further intervention at this time. 29 yo F sent from  for chest pain in the setting of +covid.   VSS. no hypoxia at rest or exertion  lungs ctabl, sfs.  no FND  ekg nsr @ 59bpm. no kavitha/std/twi  cxr unremarkable.  low suspicion for pe, acs.   covid instructions, isolation precautions. pulse ox instructions. DC with pmd f/u.  Based on the H&P, I do not suspect any life- / limb- threatening pathology that requires further intervention at this time.

## 2022-03-10 ENCOUNTER — EMERGENCY (EMERGENCY)
Facility: HOSPITAL | Age: 32
LOS: 1 days | Discharge: HOME | End: 2022-03-10
Attending: EMERGENCY MEDICINE
Payer: COMMERCIAL

## 2022-03-10 VITALS
DIASTOLIC BLOOD PRESSURE: 66 MMHG | SYSTOLIC BLOOD PRESSURE: 105 MMHG | WEIGHT: 128.97 LBS | RESPIRATION RATE: 18 BRPM | HEIGHT: 63 IN | OXYGEN SATURATION: 99 % | TEMPERATURE: 99 F | HEART RATE: 68 BPM

## 2022-03-10 LAB
BASOPHILS # BLD AUTO: 0.03 K/UL — SIGNIFICANT CHANGE UP (ref 0–0.2)
BASOPHILS NFR BLD AUTO: 0.3 % — SIGNIFICANT CHANGE UP (ref 0–2)
EOSINOPHIL # BLD AUTO: 0.06 K/UL — SIGNIFICANT CHANGE UP (ref 0–0.5)
EOSINOPHIL NFR BLD AUTO: 0.6 % — SIGNIFICANT CHANGE UP (ref 0–6)
HCT VFR BLD CALC: 39 % — SIGNIFICANT CHANGE UP (ref 34.5–45)
HGB BLD-MCNC: 12.8 G/DL — SIGNIFICANT CHANGE UP (ref 11.5–15.5)
IMM GRANULOCYTES NFR BLD AUTO: 0.4 % — SIGNIFICANT CHANGE UP (ref 0–1.5)
LACTATE BLDV-MCNC: 0.6 MMOL/L — SIGNIFICANT CHANGE UP (ref 0.5–2)
LYMPHOCYTES # BLD AUTO: 1.75 K/UL — SIGNIFICANT CHANGE UP (ref 1–3.3)
LYMPHOCYTES # BLD AUTO: 16.5 % — SIGNIFICANT CHANGE UP (ref 13–44)
MCHC RBC-ENTMCNC: 30.6 PG — SIGNIFICANT CHANGE UP (ref 27–34)
MCHC RBC-ENTMCNC: 32.8 GM/DL — SIGNIFICANT CHANGE UP (ref 32–36)
MCV RBC AUTO: 93.3 FL — SIGNIFICANT CHANGE UP (ref 80–100)
MONOCYTES # BLD AUTO: 0.93 K/UL — HIGH (ref 0–0.9)
MONOCYTES NFR BLD AUTO: 8.8 % — SIGNIFICANT CHANGE UP (ref 2–14)
NEUTROPHILS # BLD AUTO: 7.78 K/UL — HIGH (ref 1.8–7.4)
NEUTROPHILS NFR BLD AUTO: 73.4 % — SIGNIFICANT CHANGE UP (ref 43–77)
PLATELET # BLD AUTO: 228 K/UL — SIGNIFICANT CHANGE UP (ref 150–400)
RBC # BLD: 4.18 M/UL — SIGNIFICANT CHANGE UP (ref 3.8–5.2)
RBC # FLD: 12.5 % — SIGNIFICANT CHANGE UP (ref 10.3–14.5)
WBC # BLD: 10.59 K/UL — HIGH (ref 3.8–10.5)
WBC # FLD AUTO: 10.59 K/UL — HIGH (ref 3.8–10.5)

## 2022-03-10 PROCEDURE — 99285 EMERGENCY DEPT VISIT HI MDM: CPT

## 2022-03-10 PROCEDURE — 99283 EMERGENCY DEPT VISIT LOW MDM: CPT

## 2022-03-10 RX ORDER — MORPHINE SULFATE 50 MG/1
4 CAPSULE, EXTENDED RELEASE ORAL ONCE
Refills: 0 | Status: DISCONTINUED | OUTPATIENT
Start: 2022-03-10 | End: 2022-03-10

## 2022-03-10 RX ADMIN — MORPHINE SULFATE 4 MILLIGRAM(S): 50 CAPSULE, EXTENDED RELEASE ORAL at 23:32

## 2022-03-10 NOTE — ED PROVIDER NOTE - PATIENT PORTAL LINK FT
You can access the FollowMyHealth Patient Portal offered by [facility] by registering at the following website: [email or web address]. By joining [employer]’s FollowMyHealth portal, you will also be able to view your health information using other applications (apps) compatible with our system.

## 2022-03-10 NOTE — ED PROVIDER NOTE - PROGRESS NOTE DETAILS
POLO- us showws hemorrhagic cyst, Pt reassessed, pt feeling better at this time, vss, pt able to walk, talk and vocalized plan of action. Discussed in depth and explained to pt in depth the next steps that need to be taking including proper follow up with PCP or specialists. All incidental findings were discussed with pt as well. Pt verbalized their concerns and all questions were answered. Pt understands dispo and wants discharge. Given good instructions when to return to ED and importance of f/u.

## 2022-03-10 NOTE — ED ADULT NURSE NOTE - OBJECTIVE STATEMENT
c/o pelvic pain. states she has hx of ovarian cysts ans this feels similar to last time. states she uses IUD for birth control. placed in 2018.

## 2022-03-10 NOTE — ED PROVIDER NOTE - OBJECTIVE STATEMENT
30 y/o feamle with hx of ovarian cyst present sot the worsening on set of right suprapubic pain. Notes pain is gradually worsening for the past 4 days. notes had always felt it but acutely worsened tonight. Notes feels similar to her ovarian cyst in the past. Admits to pain with urination. denies fevers, chills, diarrhea, n/v,

## 2022-03-11 VITALS
TEMPERATURE: 98 F | HEART RATE: 48 BPM | RESPIRATION RATE: 17 BRPM | SYSTOLIC BLOOD PRESSURE: 105 MMHG | DIASTOLIC BLOOD PRESSURE: 66 MMHG | OXYGEN SATURATION: 99 %

## 2022-03-11 LAB
ALBUMIN SERPL ELPH-MCNC: 4.2 G/DL — SIGNIFICANT CHANGE UP (ref 3.3–5.2)
ALP SERPL-CCNC: 48 U/L — SIGNIFICANT CHANGE UP (ref 40–120)
ALT FLD-CCNC: 7 U/L — SIGNIFICANT CHANGE UP
ANION GAP SERPL CALC-SCNC: 11 MMOL/L — SIGNIFICANT CHANGE UP (ref 5–17)
APPEARANCE UR: CLEAR — SIGNIFICANT CHANGE UP
AST SERPL-CCNC: 11 U/L — SIGNIFICANT CHANGE UP
BILIRUB SERPL-MCNC: 0.3 MG/DL — LOW (ref 0.4–2)
BILIRUB UR-MCNC: NEGATIVE — SIGNIFICANT CHANGE UP
BUN SERPL-MCNC: 15.4 MG/DL — SIGNIFICANT CHANGE UP (ref 8–20)
CALCIUM SERPL-MCNC: 8.8 MG/DL — SIGNIFICANT CHANGE UP (ref 8.6–10.2)
CHLORIDE SERPL-SCNC: 102 MMOL/L — SIGNIFICANT CHANGE UP (ref 98–107)
CO2 SERPL-SCNC: 27 MMOL/L — SIGNIFICANT CHANGE UP (ref 22–29)
COLOR SPEC: YELLOW — SIGNIFICANT CHANGE UP
CREAT SERPL-MCNC: 0.68 MG/DL — SIGNIFICANT CHANGE UP (ref 0.5–1.3)
DIFF PNL FLD: NEGATIVE — SIGNIFICANT CHANGE UP
EGFR: 118 ML/MIN/1.73M2 — SIGNIFICANT CHANGE UP
GLUCOSE SERPL-MCNC: 94 MG/DL — SIGNIFICANT CHANGE UP (ref 70–99)
GLUCOSE UR QL: NEGATIVE MG/DL — SIGNIFICANT CHANGE UP
HCG SERPL-ACNC: <4 MIU/ML — SIGNIFICANT CHANGE UP
KETONES UR-MCNC: NEGATIVE — SIGNIFICANT CHANGE UP
LEUKOCYTE ESTERASE UR-ACNC: NEGATIVE — SIGNIFICANT CHANGE UP
NITRITE UR-MCNC: NEGATIVE — SIGNIFICANT CHANGE UP
PH UR: 8 — SIGNIFICANT CHANGE UP (ref 5–8)
POTASSIUM SERPL-MCNC: 4 MMOL/L — SIGNIFICANT CHANGE UP (ref 3.5–5.3)
POTASSIUM SERPL-SCNC: 4 MMOL/L — SIGNIFICANT CHANGE UP (ref 3.5–5.3)
PROT SERPL-MCNC: 6.5 G/DL — LOW (ref 6.6–8.7)
PROT UR-MCNC: NEGATIVE MG/DL — SIGNIFICANT CHANGE UP
SODIUM SERPL-SCNC: 140 MMOL/L — SIGNIFICANT CHANGE UP (ref 135–145)
SP GR SPEC: 1.01 — SIGNIFICANT CHANGE UP (ref 1.01–1.02)
UROBILINOGEN FLD QL: NEGATIVE MG/DL — SIGNIFICANT CHANGE UP

## 2022-03-11 PROCEDURE — 36415 COLL VENOUS BLD VENIPUNCTURE: CPT

## 2022-03-11 PROCEDURE — 76856 US EXAM PELVIC COMPLETE: CPT | Mod: 26

## 2022-03-11 PROCEDURE — 76830 TRANSVAGINAL US NON-OB: CPT

## 2022-03-11 PROCEDURE — 83605 ASSAY OF LACTIC ACID: CPT

## 2022-03-11 PROCEDURE — 99284 EMERGENCY DEPT VISIT MOD MDM: CPT | Mod: 25

## 2022-03-11 PROCEDURE — 85025 COMPLETE CBC W/AUTO DIFF WBC: CPT

## 2022-03-11 PROCEDURE — 96374 THER/PROPH/DIAG INJ IV PUSH: CPT

## 2022-03-11 PROCEDURE — 96375 TX/PRO/DX INJ NEW DRUG ADDON: CPT

## 2022-03-11 PROCEDURE — 84702 CHORIONIC GONADOTROPIN TEST: CPT

## 2022-03-11 PROCEDURE — 76830 TRANSVAGINAL US NON-OB: CPT | Mod: 26

## 2022-03-11 PROCEDURE — 87086 URINE CULTURE/COLONY COUNT: CPT

## 2022-03-11 PROCEDURE — 76856 US EXAM PELVIC COMPLETE: CPT

## 2022-03-11 PROCEDURE — 81003 URINALYSIS AUTO W/O SCOPE: CPT

## 2022-03-11 PROCEDURE — 80053 COMPREHEN METABOLIC PANEL: CPT

## 2022-03-11 RX ORDER — KETOROLAC TROMETHAMINE 30 MG/ML
30 SYRINGE (ML) INJECTION ONCE
Refills: 0 | Status: DISCONTINUED | OUTPATIENT
Start: 2022-03-11 | End: 2022-03-11

## 2022-03-11 RX ADMIN — Medication 30 MILLIGRAM(S): at 02:03

## 2022-03-12 ENCOUNTER — EMERGENCY (EMERGENCY)
Facility: HOSPITAL | Age: 32
LOS: 1 days | Discharge: HOME | End: 2022-03-12
Attending: STUDENT IN AN ORGANIZED HEALTH CARE EDUCATION/TRAINING PROGRAM
Payer: COMMERCIAL

## 2022-03-12 VITALS
WEIGHT: 179.9 LBS | RESPIRATION RATE: 20 BRPM | HEIGHT: 63 IN | SYSTOLIC BLOOD PRESSURE: 109 MMHG | OXYGEN SATURATION: 98 % | TEMPERATURE: 98 F | HEART RATE: 94 BPM | DIASTOLIC BLOOD PRESSURE: 70 MMHG

## 2022-03-12 VITALS
SYSTOLIC BLOOD PRESSURE: 118 MMHG | RESPIRATION RATE: 18 BRPM | DIASTOLIC BLOOD PRESSURE: 70 MMHG | OXYGEN SATURATION: 99 % | HEART RATE: 86 BPM

## 2022-03-12 LAB
ALBUMIN SERPL ELPH-MCNC: 4.2 G/DL — SIGNIFICANT CHANGE UP (ref 3.3–5.2)
ALP SERPL-CCNC: 53 U/L — SIGNIFICANT CHANGE UP (ref 40–120)
ALT FLD-CCNC: 7 U/L — SIGNIFICANT CHANGE UP
ANION GAP SERPL CALC-SCNC: 11 MMOL/L — SIGNIFICANT CHANGE UP (ref 5–17)
AST SERPL-CCNC: 10 U/L — SIGNIFICANT CHANGE UP
BASOPHILS # BLD AUTO: 0.03 K/UL — SIGNIFICANT CHANGE UP (ref 0–0.2)
BASOPHILS NFR BLD AUTO: 0.3 % — SIGNIFICANT CHANGE UP (ref 0–2)
BILIRUB SERPL-MCNC: 0.8 MG/DL — SIGNIFICANT CHANGE UP (ref 0.4–2)
BUN SERPL-MCNC: 11 MG/DL — SIGNIFICANT CHANGE UP (ref 8–20)
CALCIUM SERPL-MCNC: 9.4 MG/DL — SIGNIFICANT CHANGE UP (ref 8.6–10.2)
CHLORIDE SERPL-SCNC: 106 MMOL/L — SIGNIFICANT CHANGE UP (ref 98–107)
CO2 SERPL-SCNC: 25 MMOL/L — SIGNIFICANT CHANGE UP (ref 22–29)
CREAT SERPL-MCNC: 0.69 MG/DL — SIGNIFICANT CHANGE UP (ref 0.5–1.3)
CULTURE RESULTS: NO GROWTH — SIGNIFICANT CHANGE UP
EGFR: 117 ML/MIN/1.73M2 — SIGNIFICANT CHANGE UP
EOSINOPHIL # BLD AUTO: 0.03 K/UL — SIGNIFICANT CHANGE UP (ref 0–0.5)
EOSINOPHIL NFR BLD AUTO: 0.3 % — SIGNIFICANT CHANGE UP (ref 0–6)
GLUCOSE SERPL-MCNC: 92 MG/DL — SIGNIFICANT CHANGE UP (ref 70–99)
HCG SERPL-ACNC: <4 MIU/ML — SIGNIFICANT CHANGE UP
HCT VFR BLD CALC: 41.1 % — SIGNIFICANT CHANGE UP (ref 34.5–45)
HGB BLD-MCNC: 13.5 G/DL — SIGNIFICANT CHANGE UP (ref 11.5–15.5)
IMM GRANULOCYTES NFR BLD AUTO: 0.4 % — SIGNIFICANT CHANGE UP (ref 0–1.5)
LIDOCAIN IGE QN: 20 U/L — LOW (ref 22–51)
LYMPHOCYTES # BLD AUTO: 1.22 K/UL — SIGNIFICANT CHANGE UP (ref 1–3.3)
LYMPHOCYTES # BLD AUTO: 12 % — LOW (ref 13–44)
MCHC RBC-ENTMCNC: 30.7 PG — SIGNIFICANT CHANGE UP (ref 27–34)
MCHC RBC-ENTMCNC: 32.8 GM/DL — SIGNIFICANT CHANGE UP (ref 32–36)
MCV RBC AUTO: 93.4 FL — SIGNIFICANT CHANGE UP (ref 80–100)
MONOCYTES # BLD AUTO: 0.76 K/UL — SIGNIFICANT CHANGE UP (ref 0–0.9)
MONOCYTES NFR BLD AUTO: 7.5 % — SIGNIFICANT CHANGE UP (ref 2–14)
NEUTROPHILS # BLD AUTO: 8.07 K/UL — HIGH (ref 1.8–7.4)
NEUTROPHILS NFR BLD AUTO: 79.5 % — HIGH (ref 43–77)
PLATELET # BLD AUTO: 220 K/UL — SIGNIFICANT CHANGE UP (ref 150–400)
POTASSIUM SERPL-MCNC: 4.6 MMOL/L — SIGNIFICANT CHANGE UP (ref 3.5–5.3)
POTASSIUM SERPL-SCNC: 4.6 MMOL/L — SIGNIFICANT CHANGE UP (ref 3.5–5.3)
PROT SERPL-MCNC: 7 G/DL — SIGNIFICANT CHANGE UP (ref 6.6–8.7)
RBC # BLD: 4.4 M/UL — SIGNIFICANT CHANGE UP (ref 3.8–5.2)
RBC # FLD: 12.5 % — SIGNIFICANT CHANGE UP (ref 10.3–14.5)
SODIUM SERPL-SCNC: 142 MMOL/L — SIGNIFICANT CHANGE UP (ref 135–145)
SPECIMEN SOURCE: SIGNIFICANT CHANGE UP
WBC # BLD: 10.15 K/UL — SIGNIFICANT CHANGE UP (ref 3.8–10.5)
WBC # FLD AUTO: 10.15 K/UL — SIGNIFICANT CHANGE UP (ref 3.8–10.5)

## 2022-03-12 PROCEDURE — 84702 CHORIONIC GONADOTROPIN TEST: CPT

## 2022-03-12 PROCEDURE — 85025 COMPLETE CBC W/AUTO DIFF WBC: CPT

## 2022-03-12 PROCEDURE — 74177 CT ABD & PELVIS W/CONTRAST: CPT | Mod: MB

## 2022-03-12 PROCEDURE — 76705 ECHO EXAM OF ABDOMEN: CPT | Mod: 26,RT

## 2022-03-12 PROCEDURE — 99284 EMERGENCY DEPT VISIT MOD MDM: CPT | Mod: 25

## 2022-03-12 PROCEDURE — 74177 CT ABD & PELVIS W/CONTRAST: CPT | Mod: 26,MB

## 2022-03-12 PROCEDURE — 99285 EMERGENCY DEPT VISIT HI MDM: CPT

## 2022-03-12 PROCEDURE — 36415 COLL VENOUS BLD VENIPUNCTURE: CPT

## 2022-03-12 PROCEDURE — 83690 ASSAY OF LIPASE: CPT

## 2022-03-12 PROCEDURE — 80053 COMPREHEN METABOLIC PANEL: CPT

## 2022-03-12 PROCEDURE — 96374 THER/PROPH/DIAG INJ IV PUSH: CPT | Mod: XU

## 2022-03-12 PROCEDURE — 96375 TX/PRO/DX INJ NEW DRUG ADDON: CPT

## 2022-03-12 PROCEDURE — 76705 ECHO EXAM OF ABDOMEN: CPT

## 2022-03-12 RX ORDER — FAMOTIDINE 10 MG/ML
20 INJECTION INTRAVENOUS ONCE
Refills: 0 | Status: COMPLETED | OUTPATIENT
Start: 2022-03-12 | End: 2022-03-12

## 2022-03-12 RX ORDER — KETOROLAC TROMETHAMINE 30 MG/ML
15 SYRINGE (ML) INJECTION ONCE
Refills: 0 | Status: DISCONTINUED | OUTPATIENT
Start: 2022-03-12 | End: 2022-03-12

## 2022-03-12 RX ORDER — LIDOCAINE 4 G/100G
15 CREAM TOPICAL ONCE
Refills: 0 | Status: COMPLETED | OUTPATIENT
Start: 2022-03-12 | End: 2022-03-12

## 2022-03-12 RX ORDER — ONDANSETRON 8 MG/1
4 TABLET, FILM COATED ORAL ONCE
Refills: 0 | Status: COMPLETED | OUTPATIENT
Start: 2022-03-12 | End: 2022-03-12

## 2022-03-12 RX ORDER — SUCRALFATE 1 G
1 TABLET ORAL ONCE
Refills: 0 | Status: COMPLETED | OUTPATIENT
Start: 2022-03-12 | End: 2022-03-12

## 2022-03-12 RX ORDER — SODIUM CHLORIDE 9 MG/ML
1000 INJECTION INTRAMUSCULAR; INTRAVENOUS; SUBCUTANEOUS ONCE
Refills: 0 | Status: COMPLETED | OUTPATIENT
Start: 2022-03-12 | End: 2022-03-12

## 2022-03-12 RX ORDER — OXYCODONE AND ACETAMINOPHEN 5; 325 MG/1; MG/1
1 TABLET ORAL ONCE
Refills: 0 | Status: DISCONTINUED | OUTPATIENT
Start: 2022-03-12 | End: 2022-03-12

## 2022-03-12 RX ORDER — MORPHINE SULFATE 50 MG/1
4 CAPSULE, EXTENDED RELEASE ORAL ONCE
Refills: 0 | Status: DISCONTINUED | OUTPATIENT
Start: 2022-03-12 | End: 2022-03-12

## 2022-03-12 RX ORDER — HYDROMORPHONE HYDROCHLORIDE 2 MG/ML
0.5 INJECTION INTRAMUSCULAR; INTRAVENOUS; SUBCUTANEOUS ONCE
Refills: 0 | Status: DISCONTINUED | OUTPATIENT
Start: 2022-03-12 | End: 2022-03-12

## 2022-03-12 RX ORDER — FAMOTIDINE 10 MG/ML
1 INJECTION INTRAVENOUS
Qty: 30 | Refills: 0
Start: 2022-03-12 | End: 2022-04-10

## 2022-03-12 RX ADMIN — Medication 15 MILLIGRAM(S): at 13:48

## 2022-03-12 RX ADMIN — MORPHINE SULFATE 4 MILLIGRAM(S): 50 CAPSULE, EXTENDED RELEASE ORAL at 11:17

## 2022-03-12 RX ADMIN — Medication 1 GRAM(S): at 11:18

## 2022-03-12 RX ADMIN — HYDROMORPHONE HYDROCHLORIDE 0.5 MILLIGRAM(S): 2 INJECTION INTRAMUSCULAR; INTRAVENOUS; SUBCUTANEOUS at 18:11

## 2022-03-12 RX ADMIN — Medication 15 MILLIGRAM(S): at 12:52

## 2022-03-12 RX ADMIN — FAMOTIDINE 20 MILLIGRAM(S): 10 INJECTION INTRAVENOUS at 11:17

## 2022-03-12 RX ADMIN — HYDROMORPHONE HYDROCHLORIDE 0.5 MILLIGRAM(S): 2 INJECTION INTRAMUSCULAR; INTRAVENOUS; SUBCUTANEOUS at 19:30

## 2022-03-12 RX ADMIN — OXYCODONE AND ACETAMINOPHEN 1 TABLET(S): 5; 325 TABLET ORAL at 15:50

## 2022-03-12 RX ADMIN — ONDANSETRON 4 MILLIGRAM(S): 8 TABLET, FILM COATED ORAL at 22:41

## 2022-03-12 RX ADMIN — MORPHINE SULFATE 4 MILLIGRAM(S): 50 CAPSULE, EXTENDED RELEASE ORAL at 11:18

## 2022-03-12 RX ADMIN — SODIUM CHLORIDE 1000 MILLILITER(S): 9 INJECTION INTRAMUSCULAR; INTRAVENOUS; SUBCUTANEOUS at 11:18

## 2022-03-12 RX ADMIN — LIDOCAINE 15 MILLILITER(S): 4 CREAM TOPICAL at 22:08

## 2022-03-12 RX ADMIN — Medication 30 MILLILITER(S): at 22:08

## 2022-03-12 NOTE — ED PROVIDER NOTE - ATTENDING CONTRIBUTION TO CARE
I performed a face to face history and physical exam of the patient and discussed their management with the student/resident/ACP. I reviewed the student/resident/ACP's note and agree with the documented findings and plan of care.    labs and imaging reviewed.  pt feeling better. symptoms c/w gastritis.  incidentally found to have small gallstones. instructed to f/up with gen surg. given return instructions. I performed a face to face history and physical exam of the patient and discussed their management with the student/resident/ACP. I reviewed the student/resident/ACP's note and agree with the documented findings and plan of care.    labs and imaging reviewed.  pt still having pain. will check CT.  PA to follow for results and dispo as per ct.

## 2022-03-12 NOTE — ED PROVIDER NOTE - NSFOLLOWUPINSTRUCTIONS_ED_ALL_ED_FT
please follow up with surgery outpatient  return to the emergency department for any worsening symptoms

## 2022-03-12 NOTE — ED PROVIDER NOTE - OBJECTIVE STATEMENT
Pt is a 30 yo F co upper abd pain.  PT states that she was here 2 d ago for lower pelvic pain and had an US showing hemorrhagic cyst. PT states that yesterday she started with severe, nonradiating upper abd pain. Pt states that eating does not make it worse and she has been able to eat some food.  no n/v. no fever/chills. no cp. no sob. no other complaints.

## 2022-03-12 NOTE — ED PROVIDER NOTE - PROGRESS NOTE DETAILS
reviewed ultrasound results lab work pt to follow up with surgery outpatient  pt explained dc instructions pt with continued pain will obtain ct abd/pelvis US + small gall stones noted and results discussed with patient. CT pending . Pt stable and no tenderness on epigastric region  palpation. Pt moved form intake Room. Pt seen and evaluated by intake Physician. HPI, Physical examination performed by intake Physician . Note reviewed and followup examination performed by me consistent with initial assessment. Agrees with intake Physician plan and tests. Pt treated with Viscous Lidocaine and Maalox for hey symptoms. Pt states that she feels like vomiting and lightheaded. Zofran given and D/C .

## 2022-03-12 NOTE — ED PROVIDER NOTE - CARE PLAN
1 Principal Discharge DX:	Abdominal pain   Principal Discharge DX:	Abdominal pain  Secondary Diagnosis:	Umbilical hernia  Secondary Diagnosis:	Gall stones

## 2022-03-12 NOTE — ED PROVIDER NOTE - PATIENT PORTAL LINK FT
You can access the FollowMyHealth Patient Portal offered by [facility] by registering at the following website: [email or web address]. By joining [employer]’s FollowMyHealth portal, you will also be able to view your health information using other applications (apps) compatible with our system. You can access the FollowMyHealth Patient Portal offered by [facility] by registering at the following website: [email or web address]. By joining [employer]’s FollowMyHealth portal, you will also be able to view your health information using other applications (apps) compatible with our system.

## 2022-03-12 NOTE — ED PROVIDER NOTE - CARE PROVIDER_API CALL
Burton Carvajal)  Surgery; Surgical Critical Care  [facility] - Dept of Surgery, [address] [place] [address]  Phone: [phone]  Fax: [phone]  Follow Up Time:

## 2022-03-12 NOTE — ED PROVIDER NOTE - PHYSICAL EXAMINATION
Constitutional - well-developed. Head - NCAT. Airway patent. Eyes - PERRL. CV - RRR. no murmur. no edema. Pulm - CTAB. Abd - soft, upper abd ttp. no rebound. no guarding. Neuro - A&Ox3. strength 5/5 x4. sensation intact x4. normal gait. Skin - No rash. MSK - normal ROM.

## 2022-04-05 NOTE — ED ADULT NURSE NOTE - CARDIO ASSESSMENT
--- Arava Counseling:  Patient counseled regarding adverse effects of Arava including but not limited to nausea, vomiting, abnormalities in liver function tests. Patients may develop mouth sores, rash, diarrhea, and abnormalities in blood counts. The patient understands that monitoring is required including LFTs and blood counts.  There is a rare possibility of scarring of the liver and lung problems that can occur when taking methotrexate. Persistent nausea, loss of appetite, pale stools, dark urine, cough, and shortness of breath should be reported immediately. Patient advised to discontinue Arava treatment and consult with a physician prior to attempting conception. The patient will have to undergo a treatment to eliminate Arava from the body prior to conception.

## 2022-09-19 ENCOUNTER — NON-APPOINTMENT (OUTPATIENT)
Age: 32
End: 2022-09-19

## 2022-09-20 ENCOUNTER — EMERGENCY (EMERGENCY)
Facility: HOSPITAL | Age: 32
LOS: 1 days | Discharge: HOME | End: 2022-09-20
Attending: EMERGENCY MEDICINE
Payer: COMMERCIAL

## 2022-09-20 VITALS
SYSTOLIC BLOOD PRESSURE: 140 MMHG | RESPIRATION RATE: 18 BRPM | HEIGHT: 63 IN | OXYGEN SATURATION: 100 % | WEIGHT: 175.93 LBS | HEART RATE: 42 BPM | DIASTOLIC BLOOD PRESSURE: 83 MMHG | TEMPERATURE: 98 F

## 2022-09-20 LAB
ALBUMIN SERPL ELPH-MCNC: 4.5 G/DL — SIGNIFICANT CHANGE UP (ref 3.3–5.2)
ALP SERPL-CCNC: 55 U/L — SIGNIFICANT CHANGE UP (ref 40–120)
ALT FLD-CCNC: 8 U/L — SIGNIFICANT CHANGE UP
ANION GAP SERPL CALC-SCNC: 10 MMOL/L — SIGNIFICANT CHANGE UP (ref 5–17)
AST SERPL-CCNC: 12 U/L — SIGNIFICANT CHANGE UP
BASOPHILS # BLD AUTO: 0.03 K/UL — SIGNIFICANT CHANGE UP (ref 0–0.2)
BASOPHILS NFR BLD AUTO: 0.5 % — SIGNIFICANT CHANGE UP (ref 0–2)
BILIRUB SERPL-MCNC: 0.5 MG/DL — SIGNIFICANT CHANGE UP (ref 0.4–2)
BUN SERPL-MCNC: 9.9 MG/DL — SIGNIFICANT CHANGE UP (ref 8–20)
CALCIUM SERPL-MCNC: 8.8 MG/DL — SIGNIFICANT CHANGE UP (ref 8.4–10.5)
CHLORIDE SERPL-SCNC: 103 MMOL/L — SIGNIFICANT CHANGE UP (ref 98–107)
CO2 SERPL-SCNC: 26 MMOL/L — SIGNIFICANT CHANGE UP (ref 22–29)
CREAT SERPL-MCNC: 0.72 MG/DL — SIGNIFICANT CHANGE UP (ref 0.5–1.3)
EGFR: 112 ML/MIN/1.73M2 — SIGNIFICANT CHANGE UP
EOSINOPHIL # BLD AUTO: 0.05 K/UL — SIGNIFICANT CHANGE UP (ref 0–0.5)
EOSINOPHIL NFR BLD AUTO: 0.8 % — SIGNIFICANT CHANGE UP (ref 0–6)
GLUCOSE SERPL-MCNC: 85 MG/DL — SIGNIFICANT CHANGE UP (ref 70–99)
HCG SERPL-ACNC: <4 MIU/ML — SIGNIFICANT CHANGE UP
HCT VFR BLD CALC: 41.4 % — SIGNIFICANT CHANGE UP (ref 34.5–45)
HGB BLD-MCNC: 13.7 G/DL — SIGNIFICANT CHANGE UP (ref 11.5–15.5)
IMM GRANULOCYTES NFR BLD AUTO: 0.3 % — SIGNIFICANT CHANGE UP (ref 0–0.9)
LYMPHOCYTES # BLD AUTO: 1.94 K/UL — SIGNIFICANT CHANGE UP (ref 1–3.3)
LYMPHOCYTES # BLD AUTO: 29.9 % — SIGNIFICANT CHANGE UP (ref 13–44)
MAGNESIUM SERPL-MCNC: 2 MG/DL — SIGNIFICANT CHANGE UP (ref 1.6–2.6)
MCHC RBC-ENTMCNC: 31.2 PG — SIGNIFICANT CHANGE UP (ref 27–34)
MCHC RBC-ENTMCNC: 33.1 GM/DL — SIGNIFICANT CHANGE UP (ref 32–36)
MCV RBC AUTO: 94.3 FL — SIGNIFICANT CHANGE UP (ref 80–100)
MONOCYTES # BLD AUTO: 0.59 K/UL — SIGNIFICANT CHANGE UP (ref 0–0.9)
MONOCYTES NFR BLD AUTO: 9.1 % — SIGNIFICANT CHANGE UP (ref 2–14)
NEUTROPHILS # BLD AUTO: 3.85 K/UL — SIGNIFICANT CHANGE UP (ref 1.8–7.4)
NEUTROPHILS NFR BLD AUTO: 59.4 % — SIGNIFICANT CHANGE UP (ref 43–77)
NT-PROBNP SERPL-SCNC: 30 PG/ML — SIGNIFICANT CHANGE UP (ref 0–300)
PLATELET # BLD AUTO: 266 K/UL — SIGNIFICANT CHANGE UP (ref 150–400)
POTASSIUM SERPL-MCNC: 3.9 MMOL/L — SIGNIFICANT CHANGE UP (ref 3.5–5.3)
POTASSIUM SERPL-SCNC: 3.9 MMOL/L — SIGNIFICANT CHANGE UP (ref 3.5–5.3)
PROT SERPL-MCNC: 7 G/DL — SIGNIFICANT CHANGE UP (ref 6.6–8.7)
RBC # BLD: 4.39 M/UL — SIGNIFICANT CHANGE UP (ref 3.8–5.2)
RBC # FLD: 12.5 % — SIGNIFICANT CHANGE UP (ref 10.3–14.5)
SODIUM SERPL-SCNC: 139 MMOL/L — SIGNIFICANT CHANGE UP (ref 135–145)
T3 SERPL-MCNC: 98 NG/DL — SIGNIFICANT CHANGE UP (ref 80–200)
T4 AB SER-ACNC: 5.9 UG/DL — SIGNIFICANT CHANGE UP (ref 4.5–12)
TROPONIN T SERPL-MCNC: <0.01 NG/ML — SIGNIFICANT CHANGE UP (ref 0–0.06)
TROPONIN T SERPL-MCNC: <0.01 NG/ML — SIGNIFICANT CHANGE UP (ref 0–0.06)
TSH SERPL-MCNC: 2.96 UIU/ML — SIGNIFICANT CHANGE UP (ref 0.27–4.2)
WBC # BLD: 6.48 K/UL — SIGNIFICANT CHANGE UP (ref 3.8–10.5)
WBC # FLD AUTO: 6.48 K/UL — SIGNIFICANT CHANGE UP (ref 3.8–10.5)

## 2022-09-20 PROCEDURE — 71045 X-RAY EXAM CHEST 1 VIEW: CPT | Mod: 26

## 2022-09-20 PROCEDURE — 93010 ELECTROCARDIOGRAM REPORT: CPT

## 2022-09-20 PROCEDURE — 99220: CPT

## 2022-09-20 RX ORDER — ASPIRIN/CALCIUM CARB/MAGNESIUM 324 MG
162 TABLET ORAL ONCE
Refills: 0 | Status: COMPLETED | OUTPATIENT
Start: 2022-09-20 | End: 2022-09-20

## 2022-09-20 RX ORDER — ASPIRIN/CALCIUM CARB/MAGNESIUM 324 MG
162 TABLET ORAL DAILY
Refills: 0 | Status: DISCONTINUED | OUTPATIENT
Start: 2022-09-20 | End: 2022-09-20

## 2022-09-20 RX ADMIN — Medication 162 MILLIGRAM(S): at 22:46

## 2022-09-20 NOTE — ED PROVIDER NOTE - PHYSICAL EXAMINATION
General: nontoxic appearing in no acute distress. Alert and cooperative.   Head: Normocephalic, atraumatic.  Eyes: PERRLA. No conjunctival injection. No scleral icterus. EOMI  ENMT: Atraumatic external nose and ears. Moist mucous membranes. Oropharynx clear.  Neck: Soft and supple. Full ROM without pain.  Cardiac: Regular rate and regular rhythm. No murmurs. Peripheral pulses 2+ and symmetric in all extremities. No LE edema.  Resp: Unlabored respiratory effort. Lungs CTAB.  Abd: Soft, non-tender, non-distended.   MSK: Spine midline and non-tender.   Skin: Warm and dry. No rashes.  Neuro: AO x 3. Moves all extremities symmetrically. Motor strength and sensation grossly intact.

## 2022-09-20 NOTE — ED ADULT NURSE NOTE - CAS ELECT INFOMATION PROVIDED
DC instructions provided and reviewed by DAVID Viramontes. Patient and family in understanding of all dc instructions. No further questions for the RN regarding DC. Ambulatory with steady gait./DC instructions

## 2022-09-20 NOTE — ED PROVIDER NOTE - OBJECTIVE STATEMENT
32y Female with no medical history presenting with chest pain x 2 days and lightheadedness with exertional shortness of breath with no syncope, cough, palpitations, nausea, vomiting, abdominal pain, focal weakness.

## 2022-09-20 NOTE — ED CDU PROVIDER INITIAL DAY NOTE - OBJECTIVE STATEMENT
32y Female with no medical history  not on any med presenting with chest pain x 2 days and lightheadedness with exertional shortness of breath . pt states previously been told by pcp that she has low hear rate but she never  f.u with cardiology. she had f.u in urgent [facility] on EKG she had sinus bradycardia sent to ER.   family hx of CAD mother with 2 stent at age 72  denies any  syncope, cough, palpitations, nausea, vomiting, abdominal pain, focal weakness.

## 2022-09-20 NOTE — ED CDU PROVIDER INITIAL DAY NOTE - MEDICAL DECISION MAKING DETAILS
32y Female with no medical history  not on any med presenting with chest pain x 2 days and lightheadedness with exertional shortness of breath . pt states previously been told by pcp that she has low hear rate but she never  f.u with cardiology. she had f.u in [facility] on EKG she had sinus bradycardia sent to ER  xray and labs W,o sig fining   asa 162mg stat pt still has c.p   cont telemetry   called ssc fro consult

## 2022-09-20 NOTE — ED PROVIDER NOTE - TEMPLATE, MLM
VIEW ALL Princess Granados), Internal Medicine  [address] [place] [address] [address]  [place] [address]  Phone: [phone]  Fax: [phone]    Sebastián Lockett), Hematology; Internal Medicine; Medical Oncology  [address] [place] [address]  Phone: [phone]  Fax: [phone]

## 2022-09-20 NOTE — ED ADULT NURSE NOTE - NSIMPLEMENTINTERV_GEN_ALL_ED
Detail Level: Detailed
Body Location Override (Optional - Billing Will Still Be Based On Selected Body Map Location If Applicable): upper sternum
Add 65939 Cpt? (Important Note: In 2017 The Use Of 85484 Is Being Tracked By Cms To Determine Future Global Period Reimbursement For Global Periods): yes
Implemented All Universal Safety Interventions:  [place] to call system. Call bell, personal items and telephone within reach. Instruct patient to call for assistance. Room bathroom lighting operational. Non-slip footwear when patient is off stretcher. Physically safe environment: no spills, clutter or unnecessary equipment. Stretcher in lowest position, wheels locked, appropriate side rails in place.

## 2022-09-20 NOTE — ED CDU PROVIDER INITIAL DAY NOTE - NSICDXFAMILYHX_GEN_ALL_CORE_FT
FAMILY HISTORY:  Father  Still living? Unknown  FH: HTN (hypertension), Age at diagnosis: Age Unknown    Mother  Still living? Yes, Estimated age: Age Unknown  FH: CAD (coronary artery disease), Age at diagnosis: Age Unknown

## 2022-09-20 NOTE — ED CDU PROVIDER INITIAL DAY NOTE - ATTENDING APP SHARED VISIT CONTRIBUTION OF CARE
I agree with the PA's note and was available for any issues/concerns. I was directly involved in patient care. My brief overall assessment is as follows: chest pain x 2 days, bradycardia (sinus), some headache. neg trop. family hx of cardiac disease (mother recently). cards consult, monitor

## 2022-09-20 NOTE — ED ADULT TRIAGE NOTE - LOCATION:
\Pt c/o low back spasms with radiation to bilateral buttocks.   No paresthesias or leg weakness.   PE: 5/5, SILT throughout lower extremities; +TTP about the lumbar paraspinal region. No midline pain.   Pt seen with Dr. Hooper at bedside.   Will continue with muscle relaxers.   No acute cord compression or any neurological compromise. Sx all related to lumbar paraspinal spasm.   Repeat MRI in 1 week as previously discussed.   No other treatment at this time. Left arm;

## 2022-09-20 NOTE — ED PROVIDER NOTE - CLINICAL SUMMARY MEDICAL DECISION MAKING FREE TEXT BOX
32y Female with sinus bradycardia and lightheadedness with chest pain for 2 days. Hemodynamically stable, neurovascularly intact, no rashes, no recent travel. Labs, cxr, ivf, reassess.

## 2022-09-20 NOTE — ED PROVIDER NOTE - PROGRESS NOTE DETAILS
labs, cxr reviewed. remains hemodynamically stable. will place in obs. pending repeat troponin and cardiology evaluation. -Meghann,

## 2022-09-20 NOTE — ED CDU PROVIDER INITIAL DAY NOTE - PHYSICAL EXAMINATION
Const: AOX3 nontoxic appearing, no apparent respiratory or physical distress. on cardiac monitoring   HEENT: NC/AT. Moist mucous membranes.  Eyes: TAYO. EOMI  Neck: Soft and supple. Full ROM without pain.  Cardiac: bradycardia rate and regular rhythm. +S1/S2. no reproducible chest pain . Peripheral pulses 2+ and symmetric. No LE edema.  Resp: Speaking in full sentences. No evidence of respiratory distress. No wheezes, rales or rhonchi. No adventitious breath sounds   Abd: Soft, non-tender, non-distended. Normal bowel sounds in all 4 quadrants. No guarding or rebound.  Back: Spine midline and non-tender. No CVAT.  Skin: No  lacerations.  Neuro: Awake, alert & oriented x 3. Moves all extremities symmetrically.

## 2022-09-20 NOTE — ED CDU PROVIDER INITIAL DAY NOTE - NS ED ATTENDING STATEMENT MOD
This was a shared visit with the TRINH. I reviewed and verified the documentation and independently performed the documented:

## 2022-09-20 NOTE — ED ADULT TRIAGE NOTE - SPO2 (%)
Scribe Attestation (For Scribes USE Only)... 100 Scribe Attestation (For Scribes USE Only).../Attending Attestation (For Attendings USE Only)...

## 2022-09-20 NOTE — ED ADULT NURSE NOTE - OBJECTIVE STATEMENT
Aox4. Pt complaining of chest pain, dizziness and lightheadedness x 2 days. Pt denies n/v. No PMHx. Pt on cardiac monitor in Sinus deborah 43 HR and  at 99% RA. Respirations even and unlabored . Skin warm to touch.

## 2022-09-20 NOTE — ED PROVIDER NOTE - ATTENDING CONTRIBUTION TO CARE
Chest pain and exertional dyspnea, ECG nonischemic but markedly bradycardic. Initial enzymes negative, will keep in ED obs for tele  monitoring, serial enzymes, cardiology eval.

## 2022-09-20 NOTE — ED ADULT TRIAGE NOTE - CHIEF COMPLAINT QUOTE
Ambulatory sent from  for symptomatic bradycardia. Patient reports dizziness, worsening pain on inspiration, and lightheadedness. Patient denies N/V/D at present but that she was nauseous earlier. Denies PMH/PSH, contraceptive use, familial history of blood clots, long car rides or air travel. EKG completed prior to triage, CN aware, brought back to CC.

## 2022-09-21 VITALS
DIASTOLIC BLOOD PRESSURE: 65 MMHG | RESPIRATION RATE: 17 BRPM | SYSTOLIC BLOOD PRESSURE: 109 MMHG | OXYGEN SATURATION: 100 % | HEART RATE: 40 BPM | TEMPERATURE: 98 F

## 2022-09-21 DIAGNOSIS — R00.1 BRADYCARDIA, UNSPECIFIED: ICD-10-CM

## 2022-09-21 DIAGNOSIS — R07.9 CHEST PAIN, UNSPECIFIED: ICD-10-CM

## 2022-09-21 LAB
B BURGDOR C6 AB SER-ACNC: NEGATIVE — SIGNIFICANT CHANGE UP
B BURGDOR IGG+IGM SER-ACNC: 0.09 INDEX — SIGNIFICANT CHANGE UP (ref 0.01–0.89)
SARS-COV-2 RNA SPEC QL NAA+PROBE: SIGNIFICANT CHANGE UP
TROPONIN T SERPL-MCNC: <0.01 NG/ML — SIGNIFICANT CHANGE UP (ref 0–0.06)

## 2022-09-21 PROCEDURE — U0005: CPT

## 2022-09-21 PROCEDURE — 75574 CT ANGIO HRT W/3D IMAGE: CPT | Mod: 26,ME

## 2022-09-21 PROCEDURE — 93306 TTE W/DOPPLER COMPLETE: CPT

## 2022-09-21 PROCEDURE — 84480 ASSAY TRIIODOTHYRONINE (T3): CPT

## 2022-09-21 PROCEDURE — 84702 CHORIONIC GONADOTROPIN TEST: CPT

## 2022-09-21 PROCEDURE — 80053 COMPREHEN METABOLIC PANEL: CPT

## 2022-09-21 PROCEDURE — 86618 LYME DISEASE ANTIBODY: CPT

## 2022-09-21 PROCEDURE — 84443 ASSAY THYROID STIM HORMONE: CPT

## 2022-09-21 PROCEDURE — 93010 ELECTROCARDIOGRAM REPORT: CPT

## 2022-09-21 PROCEDURE — G1004: CPT

## 2022-09-21 PROCEDURE — 85025 COMPLETE CBC W/AUTO DIFF WBC: CPT

## 2022-09-21 PROCEDURE — 84484 ASSAY OF TROPONIN QUANT: CPT

## 2022-09-21 PROCEDURE — 36415 COLL VENOUS BLD VENIPUNCTURE: CPT

## 2022-09-21 PROCEDURE — 96361 HYDRATE IV INFUSION ADD-ON: CPT

## 2022-09-21 PROCEDURE — 75574 CT ANGIO HRT W/3D IMAGE: CPT | Mod: ME

## 2022-09-21 PROCEDURE — 93306 TTE W/DOPPLER COMPLETE: CPT | Mod: 26

## 2022-09-21 PROCEDURE — 84436 ASSAY OF TOTAL THYROXINE: CPT

## 2022-09-21 PROCEDURE — 83880 ASSAY OF NATRIURETIC PEPTIDE: CPT

## 2022-09-21 PROCEDURE — 96360 HYDRATION IV INFUSION INIT: CPT | Mod: XU

## 2022-09-21 PROCEDURE — 99217: CPT

## 2022-09-21 PROCEDURE — 86666 EHRLICHIA ANTIBODY: CPT

## 2022-09-21 PROCEDURE — 83735 ASSAY OF MAGNESIUM: CPT

## 2022-09-21 PROCEDURE — G0378: CPT

## 2022-09-21 PROCEDURE — 99284 EMERGENCY DEPT VISIT MOD MDM: CPT

## 2022-09-21 PROCEDURE — 99285 EMERGENCY DEPT VISIT HI MDM: CPT | Mod: 25

## 2022-09-21 PROCEDURE — U0003: CPT

## 2022-09-21 PROCEDURE — 93005 ELECTROCARDIOGRAM TRACING: CPT

## 2022-09-21 PROCEDURE — 71045 X-RAY EXAM CHEST 1 VIEW: CPT

## 2022-09-21 PROCEDURE — 86753 PROTOZOA ANTIBODY NOS: CPT

## 2022-09-21 RX ORDER — ACETAMINOPHEN 500 MG
650 TABLET ORAL EVERY 6 HOURS
Refills: 0 | Status: DISCONTINUED | OUTPATIENT
Start: 2022-09-21 | End: 2022-09-25

## 2022-09-21 RX ORDER — SODIUM CHLORIDE 9 MG/ML
1000 INJECTION, SOLUTION INTRAVENOUS ONCE
Refills: 0 | Status: COMPLETED | OUTPATIENT
Start: 2022-09-21 | End: 2022-09-21

## 2022-09-21 RX ADMIN — SODIUM CHLORIDE 1000 MILLILITER(S): 9 INJECTION, SOLUTION INTRAVENOUS at 07:11

## 2022-09-21 RX ADMIN — SODIUM CHLORIDE 1000 MILLILITER(S): 9 INJECTION, SOLUTION INTRAVENOUS at 09:59

## 2022-09-21 RX ADMIN — Medication 650 MILLIGRAM(S): at 01:07

## 2022-09-21 NOTE — CONSULT NOTE ADULT - PROBLEM SELECTOR RECOMMENDATION 2
-marked sinus bradycardia  -pt appears to have a hx of bradycardia but HR lowed than previous  -BP stable  -avoid AV blocking agents  -workup as above

## 2022-09-21 NOTE — ED ADULT NURSE REASSESSMENT NOTE - COMFORT CARE
plan of care explained/side rails up
darkened lights/plan of care explained
ambulated to bathroom/plan of care explained/repositioned/wait time explained
ambulated to bathroom/plan of care explained/po fluids offered/repositioned/wait time explained

## 2022-09-21 NOTE — ED ADULT NURSE REASSESSMENT NOTE - ANCILLARY STATUS
CCTA, TTE read/cardiovascular tests pending
cardiovascular tests pending
cardiovascular tests pending
CCTA, TTE/awaiting radiology

## 2022-09-21 NOTE — ED CDU PROVIDER SUBSEQUENT DAY NOTE - HISTORY
pt is remain sinus deborah cardia over night down to 40 and 39 , wake the pt up goes to above 43 . pt asymptomatic trop x 3 negative Eng sinus bradycardia

## 2022-09-21 NOTE — ED CDU PROVIDER DISPOSITION NOTE - NSFOLLOWUPCLINICS_GEN_ALL_ED_FT
[facility]  Cardiology  [address], [address]  [place] [address]  Phone: [phone]  Fax:

## 2022-09-21 NOTE — ED CDU PROVIDER SUBSEQUENT DAY NOTE - ATTENDING APP SHARED VISIT CONTRIBUTION OF CARE
Pt seen and eval with ACP.  Pt going for cardiac CTA and echo and will f/u with Cardiology after testing completed

## 2022-09-21 NOTE — CONSULT NOTE ADULT - SUBJECTIVE AND OBJECTIVE BOX
[facility]                                              CARDIOLOGY AT [place] [address]                                             Telephone: [phone]. Fax:[phone]                                                       CARDIOLOGY CONSULTATION NOTE                                                                                             History obtained by: Patient and medical record  Community Cardiologist: n/a   obtained: Yes [  ] No [ x ]  Reason for Consultation: chest pain  Available out pt records reviewed: Yes [  ] No [ x ]    Chief complaint:  "My chest hurts"    HPI:  This is 33 y/o female with no significant medical hx who presents with chest pain. Pt states that pain is midsternal, going under left breast and not related to any activity. Yesterday it felt like pressure and today is sharp. She also felt lightheaded and SOB today. In the ED pt found bradycardic in low-to-mid 40's, BP stable. EKG shows no acute ST/T changes, no events on telemetry. Troponin negative x1, proBNP normal, TSH 2.96. Review of previous EKGs shows HR 59 in 1/2021 and HR 52 in 2004. Pt followed briefly with [facility] about 8 years ago. She had a stress test and wore a Holter monitor for 24 hours. Pt's mother had cardiac stents placed 2 weeks ago.    CARDIAC TESTING   ECHO: n/a    STRESS: n/a    CATH: n/a    ELECTROPHYSIOLOGY: n/a    PAST MEDICAL HISTORY  No pertinent past medical history        PAST SURGICAL HISTORY  No significant past surgical history        SOCIAL HISTORY: lives with   CIGARETTES:   never smoked  ALCOHOL: on occasion  DRUGS: denies    FAMILY HISTORY:  FH: CAD (coronary artery disease) (Mother)    FH: HTN (hypertension) (Father)      Family History of Cardiovascular Disease:  Yes [  ] No [  ]  Coronary Artery Disease in first degree relative: Yes [  ] No [  ]  Sudden Cardiac Death in First degree relative: Yes [  ] No [  ]    HOME MEDICATIONS:  ibuprofen 600 mg oral tablet: 1 tab(s) orally every 6 hours, As needed, Moderate Pain (06 Jan 2018 21:02)        ALLERGIES:   No Known Allergies      REVIEW OF SYMPTOMS:   CONSTITUTIONAL: No fever, no chills, no weight loss, no weight gain, no fatigue   ENMT:  No vertigo; No sinus or throat pain  NECK: No pain or stiffness  CARDIOVASCULAR: as per HPI  RESPIRATORY: no Shortness of breath, no cough, no wheezing  : No dysuria, no hematuria   GI: No dark color stool, no nausea, no diarrhea, no constipation, no abdominal pain   NEURO: No headache, no slurred speech   MUSCULOSKELETAL: No joint pain or swelling; No muscle, back, or extremity pain  PSYCH: No agitation, no anxiety.    ALL OTHER REVIEW OF SYSTEMS ARE NEGATIVE.    VITAL SIGNS:  T(C): 36.8 (09-20-22 @ 19:52), Max: 36.8 (09-20-22 @ 19:52)  T(F): 98.3 (09-20-22 @ 19:52), Max: 98.3 (09-20-22 @ 19:52)  HR: 45 (09-20-22 @ 22:45) (42 - 45)  BP: 125/76 (09-20-22 @ 22:45) (125/76 - 140/83)  RR: 19 (09-20-22 @ 22:45) (18 - 19)  SpO2: 100% (09-20-22 @ 22:45) (100% - 100%)    INTAKE AND OUTPUT:       PHYSICAL EXAM:  Constitutional: Comfortable . No acute distress.   HEENT: Atraumatic and normocephalic , neck is supple . no JVD. No carotid bruit.  CNS: A&Ox3. No focal deficits.   Respiratory: CTAB, unlabored   Cardiovascular: deborah, RRR normal s1 s2. No murmurs  Gastrointestinal: Soft, non-tender. +Bowel sounds.   Extremities: 2+ Peripheral Pulses, No clubbing, cyanosis, or edema  Psychiatric: Calm . no agitation.   Skin: Warm and dry, no ulcers on extremities     LABS:  ( 20 Sep 2022 23:06 )  Troponin T  <0.01,  CPK  X    , CKMB  X    , BNP X        , ( 20 Sep 2022 20:10 )  Troponin T  <0.01,  CPK  X    , CKMB  X    , BNP 30                                 13.7   6.48  )-----------( 266      ( 20 Sep 2022 20:10 )             41.4     09-20    139  |  103  |  9.9  ----------------------------<  85  3.9   |  26.0  |  0.72    Ca    8.8      20 Sep 2022 20:10  Mg     2.0     09-20    TPro  7.0  /  Alb  4.5  /  TBili  0.5  /  DBili  x   /  AST  12  /  ALT  8   /  AlkPhos  55  09-20        Thyroid Stimulating Hormone, Serum: 2.96 uIU/mL (09-20-22 @ 20:10)      INTERPRETATION OF TELEMETRY:     ECG: sinus bradycardia 40 bpm, no acute ST/T changes  Prior ECG: Yes [ x ] No [  ]    RADIOLOGY & ADDITIONAL STUDIES:    X-ray:    CT scan:   MRI:   US:

## 2022-09-21 NOTE — CONSULT NOTE ADULT - ASSESSMENT
This is 33 y/o female with no significant medical hx who presents with chest pain. Pt states that pain is midsternal, going under left breast and not related to any activity. Yesterday it felt like pressure and today is sharp. She also felt lightheaded and SOB today. In the ED pt found bradycardic in low-to-mid 40's, BP stable. EKG shows no acute ST/T changes, no events on telemetry. Troponin negative x1, proBNP normal, TSH 2.96. Review of previous EKGs shows HR 59 in 1/2021 and HR 52 in 2004. Pt followed briefly with [facility] about 8 years ago. She had a stress test and wore a Holter monitor for 24 hours. Pt's mother had cardiac stents placed 2 weeks ago.

## 2022-09-21 NOTE — CONSULT NOTE ADULT - NS ATTEND AMEND GEN_ALL_CORE FT
33 y/o F with no significant PMH presents with midsternal chest pressure that started yesterday. Pain is constant, nonexertional, radiating under left breast. C/o associated dyspnea.  Bradycardic on telemetry, HR 38 at time of exam, improved to 59 with movement. SR, no AV blocks.   Troponin negative x 3  EKG with no ST-T changes   TTE normal LVEF, no significant valve disease  CCTA pending for further evaluation of chest pain   If CCTA is grossly normal, no further inpatient cardiac workup will be needed. Outpatient [facility] for further evaluation of bradycardia

## 2022-09-21 NOTE — ED CDU PROVIDER DISPOSITION NOTE - CLINICAL COURSE
Pt is a 32y F with no medical history  not on any med presenting with chest pain x 2 days and lightheadedness with exertional shortness of breath . pt states previously been told by pcp that she has low hear rate but she never  f.u with cardiology. she had f.u in [facility] on EKG she had sinus bradycardia sent to ER.  In ER had 3 negative trops. Seen by cards and had ECHO and Cardiac CTA. Will need outpt f/u for bradycardia.   denies any  syncope, cough, palpitations, nausea, vomiting, abdominal pain, focal weakness.

## 2022-09-21 NOTE — ED CDU PROVIDER DISPOSITION NOTE - NS ED ATTENDING STATEMENT MOD
I have seen and examined this patient and fully participated in the care of this patient as the teaching attending.  The service was shared with the TRINH.  I reviewed and verified the documentation and independently performed the documented:

## 2022-09-21 NOTE — ED ADULT NURSE REASSESSMENT NOTE - GENERAL PATIENT STATE
resting/sleeping
comfortable appearance/cooperative

## 2022-09-21 NOTE — ED CDU PROVIDER DISPOSITION NOTE - NSFOLLOWUPINSTRUCTIONS_ED_ALL_ED_FT
Follow up with cardiology within 1-2 weeks for further evaluation of bradycardia.  Come back with new or worsening symptoms.  Chest Pain    Chest pain can be caused by many different conditions which may or may not be dangerous. Causes include heartburn, lung infections, heart attack, blood clot in lungs, skin infections, strain or damage to muscle, cartilage, or bones, etc. In addition to a history and physical examination, an electrocardiogram (ECG) or other lab tests may have been performed to determine the cause of your chest pain. Follow up with your primary care provider or with a cardiologist as instructed.     SEEK IMMEDIATE MEDICAL CARE IF YOU HAVE ANY OF THE FOLLOWING SYMPTOMS: worsening chest pain, coughing up blood, unexplained back/neck/jaw pain, severe abdominal pain, dizziness or lightheadedness, fainting, shortness of breath, sweaty or clammy skin, vomiting, or racing heart beat. These symptoms may represent a serious problem that is an emergency. Do not wait to see if the symptoms will go away. Get medical help right away. Call 911 and do not drive yourself to the hospital.

## 2022-09-21 NOTE — ED CDU PROVIDER SUBSEQUENT DAY NOTE - MEDICAL DECISION MAKING DETAILS
32y Female with no medical history  not on any med presenting with chest pain x 2 days and lightheadedness with exertional shortness of breath . pt states previously been told by pcp that she has low hear rate but she never  f.u with cardiology. she had f[facility]u in [facility] on EKG she had sinus bradycardia sent to ER    xray and labs W,o sig finding   asa 162mg stat pt still has c.p   cont telemetry trop x 3 negative Ekg sinus deborah cardia   called [facility] fro consult

## 2022-09-21 NOTE — ED ADULT NURSE REASSESSMENT NOTE - NS ED NURSE REASSESS COMMENT FT1
DAVID Patrick aware patient c/o CP. HR in 40's. EKG to be performed. PA aware of BP. awaiting IVF orders.
patient sleeping in NAD. awaiting echo in AM
care assumed from CORRIE Flanagan. patient AAO x 4. c/o headache. DAVID Patrick made aware. awaiting orders. on obs pending cardiology consult for bradycardia, denies CP/SOB at present. all questions answered. tele monitoring in use. bed in lowest position, call bell within reach.
Assumed care of the patient at 0730. Verbal report received from Merly DENTON obs. Patient A&Ox4. No s/s of acute distress. States mid CP persists, NI, denies any SOB or dizziness. Sinus deborah on CM. Alerted by monitor tech HR went down to 31, now HR 38-45, PA Desimmonsly notified and acknowledged, no further orders received. PIV patent. Ambulatory with steady gait. Remains NPO pending CCTA and TTE testing. Patient in understanding of plan of care. Patient with no further questions for the RN. Resting in comfort. Call bell within reach and encouraged to use when assistance needed. Will continue to monitor.

## 2022-09-21 NOTE — ED CDU PROVIDER DISPOSITION NOTE - PATIENT PORTAL LINK FT
You can access the FollowMyHealth Patient Portal offered by [facility] by registering at the following website: [email or web address]. By joining [employer]’s FollowMyHealth portal, you will also be able to view your health information using other applications (apps) compatible with our system.

## 2022-09-22 LAB
A PHAGOCYTOPH IGG TITR SER IF: SIGNIFICANT CHANGE UP TITER
B BURGDOR AB SER QL IA: NEGATIVE — SIGNIFICANT CHANGE UP
B MICROTI IGG TITR SER: SIGNIFICANT CHANGE UP TITER
E CHAFFEENSIS IGG TITR SER IF: SIGNIFICANT CHANGE UP TITER

## 2022-09-23 ENCOUNTER — TRANSCRIPTION ENCOUNTER (OUTPATIENT)
Age: 32
End: 2022-09-23

## 2022-10-03 NOTE — ED ADULT NURSE NOTE - HISTORY OF COVID-19 VACCINATION
Daily Note     Today's date: 10/3/2022  Patient name: Sylvie Ye  : 2010  MRN: [id number]  Referring provider: Bhavani Leung  Dx:   Encounter Diagnosis     ICD-10-CM    1  Closed displaced fracture of right femoral neck ([facility] [address] )  [id number] 001A        Start Time: 3609  Stop Time: 1036  Total time in clinic (min): 38 minutes    Subjective: Patient indicated that his leg is feeling good today and he is not having any pain  Objective: See treatment diary below      Assessment: Gait training on crutches was instructed and performed during today's therapy session  This was done to transition patient from rolling walker to crutches to become more independent with ambulation  Patient was safe and independent with crutch training  New exercise of heel slide into abduction and SLR were instructed and performed to help improve LE ROM and strength  Plan: Continue per plan of care  Precautions: Fall risk        Manuals 9/21 9/23 9/26 10/3                                                             Neuro Re-Ed                                                                                                        Ther Ex             SRC              QS 2x10 5" 2x10 2x10 5" 3x10 5"         Ankle pumps  3x10 3x10 3x10         Standing 3-way hip SLR  3x10 3x10 3x10 3x10         TA brace  2x10 2x10 2x10         Heel slides  2x10, half-way through range 2x10 half-way through hip flexion range 3x10, flexion/abduction          4-way ankle RTB  2x10 2x10 3x10         LAQ  2x10 2x10 3x10         Supine clamshell  2x10 2x10 3x10 RTB         Walk in parallel bars   6x 6x 6x         Gait training-crutches   4x 4x in parallel bars         Adduction ball squeeze    2x10 5" hold         SLR     2x10         HEP 15'            Ther Activity                                       Gait Training                                       Modalities             CP Vaccine status unknown

## 2023-05-24 ENCOUNTER — EMERGENCY (EMERGENCY)
Facility: HOSPITAL | Age: 33
LOS: 1 days | Discharge: HOME | End: 2023-05-24
Attending: EMERGENCY MEDICINE
Payer: COMMERCIAL

## 2023-05-24 VITALS
RESPIRATION RATE: 20 BRPM | HEIGHT: 66 IN | DIASTOLIC BLOOD PRESSURE: 80 MMHG | WEIGHT: 175.93 LBS | TEMPERATURE: 98 F | HEART RATE: 59 BPM | OXYGEN SATURATION: 98 % | SYSTOLIC BLOOD PRESSURE: 121 MMHG

## 2023-05-24 LAB
APPEARANCE UR: ABNORMAL
BACTERIA # UR AUTO: ABNORMAL
BILIRUB UR-MCNC: NEGATIVE — SIGNIFICANT CHANGE UP
COLOR SPEC: YELLOW — SIGNIFICANT CHANGE UP
DIFF PNL FLD: ABNORMAL
EPI CELLS # UR: SIGNIFICANT CHANGE UP
GLUCOSE UR QL: NEGATIVE MG/DL — SIGNIFICANT CHANGE UP
HCG UR QL: NEGATIVE — SIGNIFICANT CHANGE UP
KETONES UR-MCNC: NEGATIVE — SIGNIFICANT CHANGE UP
LEUKOCYTE ESTERASE UR-ACNC: ABNORMAL
NITRITE UR-MCNC: NEGATIVE — SIGNIFICANT CHANGE UP
PH UR: 6 — SIGNIFICANT CHANGE UP (ref 5–8)
PROT UR-MCNC: NEGATIVE — SIGNIFICANT CHANGE UP
RBC CASTS # UR COMP ASSIST: ABNORMAL /HPF (ref 0–4)
SP GR SPEC: 1.01 — SIGNIFICANT CHANGE UP (ref 1.01–1.02)
UROBILINOGEN FLD QL: NEGATIVE MG/DL — SIGNIFICANT CHANGE UP
WBC UR QL: >50 /HPF (ref 0–5)

## 2023-05-24 PROCEDURE — 87186 SC STD MICRODIL/AGAR DIL: CPT

## 2023-05-24 PROCEDURE — 99284 EMERGENCY DEPT VISIT MOD MDM: CPT

## 2023-05-24 PROCEDURE — 99283 EMERGENCY DEPT VISIT LOW MDM: CPT

## 2023-05-24 PROCEDURE — 81001 URINALYSIS AUTO W/SCOPE: CPT

## 2023-05-24 PROCEDURE — 87086 URINE CULTURE/COLONY COUNT: CPT

## 2023-05-24 PROCEDURE — 81025 URINE PREGNANCY TEST: CPT

## 2023-05-24 RX ORDER — CEFPODOXIME PROXETIL 100 MG
200 TABLET ORAL ONCE
Refills: 0 | Status: COMPLETED | OUTPATIENT
Start: 2023-05-24 | End: 2023-05-24

## 2023-05-24 RX ORDER — ACETAMINOPHEN 500 MG
975 TABLET ORAL ONCE
Refills: 0 | Status: COMPLETED | OUTPATIENT
Start: 2023-05-24 | End: 2023-05-24

## 2023-05-24 RX ORDER — IBUPROFEN 200 MG
600 TABLET ORAL ONCE
Refills: 0 | Status: DISCONTINUED | OUTPATIENT
Start: 2023-05-24 | End: 2023-05-24

## 2023-05-24 RX ORDER — CEFPODOXIME PROXETIL 100 MG
1 TABLET ORAL
Qty: 20 | Refills: 0
Start: 2023-05-24 | End: 2023-06-02

## 2023-05-24 RX ADMIN — Medication 975 MILLIGRAM(S): at 21:13

## 2023-05-24 RX ADMIN — Medication 200 MILLIGRAM(S): at 22:01

## 2023-05-24 NOTE — ED ADULT NURSE NOTE - OBJECTIVE STATEMENT
alert and oriented x4/ Pt presenting to the ED from home complaining of bloody urine and pain when urinating. Denies chest pain, shortness of breath, fever, chills, weakness. Respirations equal/unlabored, urine sample sent.

## 2023-05-24 NOTE — ED ADULT NURSE NOTE - WILL THE PATIENT ACCEPT THE PFIZER COVID-19 VACCINE IF ELIGIBLE AND IT IS AVAILABLE?
Subjective:      Patient ID: Dahlia Lu is a [de-identified] y.o. female    Chief Complaint   Patient presents with    Hypertension     1 week follow up       Hypertension  This is a chronic problem. The current episode started more than 1 year ago (She did try taking 2 doses of the labetalol but said it made her feel too tired. ). The problem is resistant. Pertinent negatives include no chest pain, headaches, neck pain, peripheral edema or shortness of breath. Past treatments include calcium channel blockers and lifestyle changes. The current treatment provides significant improvement. Current Outpatient Medications on File Prior to Visit   Medication Sig Dispense Refill    ASPIRIN 81 PO Take by mouth      prednisoLONE acetate (PRED FORTE) 1 % ophthalmic suspension       amLODIPine (NORVASC) 5 MG tablet Take 1 tablet by mouth daily (with breakfast) 30 tablet 2    metFORMIN (GLUCOPHAGE-XR) 500 MG extended release tablet TAKE ONE TABLET BY MOUTH TWICE A DAY 60 tablet 11    gabapentin (NEURONTIN) 300 MG capsule TAKE TWO CAPSULES BY MOUTH THREE TIMES A  capsule 2    letrozole (FEMARA) 2.5 MG tablet Take 2.5 mg by mouth daily      gabapentin (NEURONTIN) 300 MG capsule Take 2 capsules by mouth 3 times daily 540 capsule 3    ACCU-CHEK EDWAR PLUS strip USE TO TEST ONCE DAILY 50 strip 5    Accu-Chek Multiclix Lancets MISC Use bid. Dx E11.9 100 each 5    Blood Glucose Monitoring Suppl (ACCU-CHEK EDWAR PLUS) W/DEVICE KIT USE TO TEST BLOODSUGAR 1 kit 0    multivitamin (THERAGRAN) per tablet Take 1 tablet by mouth daily. No current facility-administered medications on file prior to visit. No Known Allergies    Review of Systems   Constitutional: Negative for fatigue, fever and unexpected weight change. HENT: Negative for congestion and hearing loss. Eyes: Negative for redness and visual disturbance. Respiratory: Negative for cough, chest tightness and shortness of breath.     Cardiovascular: Negative for chest pain and leg swelling. Gastrointestinal: Negative for abdominal pain and nausea. Endocrine: Negative for polydipsia and polyuria. Genitourinary: Negative for dysuria and frequency. Musculoskeletal: Negative for arthralgias, back pain and neck pain. Skin: Negative for rash and wound. Allergic/Immunologic: Negative for environmental allergies. Neurological: Negative for dizziness, weakness, numbness and headaches. Hematological: Negative for adenopathy. Does not bruise/bleed easily. Psychiatric/Behavioral: Negative for sleep disturbance. The patient is not nervous/anxious. Objective:   Physical Exam  Constitutional:       Appearance: Normal appearance. Eyes:      Extraocular Movements: Extraocular movements intact. Cardiovascular:      Rate and Rhythm: Normal rate and regular rhythm. Pulmonary:      Effort: Pulmonary effort is normal.   Musculoskeletal:      Right lower leg: No edema. Left lower leg: No edema. Lymphadenopathy:      Cervical: No cervical adenopathy. Skin:     General: Skin is warm and dry. Neurological:      Mental Status: She is alert and oriented to person, place, and time. Psychiatric:         Mood and Affect: Mood normal.         Assessment and plan       1. Primary hypertension  Elevated blood pressure. Try increasing lisinopril to 20 mg twice daily. Continue this along with amlodipine. Follow-up in 1 month. - lisinopril (PRINIVIL;ZESTRIL) 20 MG tablet; Take 1 tablet by mouth 2 times daily  Dispense: 60 tablet;  Refill: 5 No

## 2023-05-24 NOTE — ED PROVIDER NOTE - PHYSICAL EXAMINATION
Gen: well appearing, no acute distress  Head: normocephalic, atraumatic  Abd: soft, (+) generalized ttp,  non-distended, no rebound tenderness or guarding; no CVA tenderness  MSK: No edema, no visible deformities, full range of motion in all 4 extremities

## 2023-05-24 NOTE — ED PROVIDER NOTE - OBJECTIVE STATEMENT
32 year old female with no PMHx presenting for evaluation of dysuria and bloody urine x3 days. Reports associated b/l lower abdominal pain. States had L flank pain on day 1 of illness which has since resolved. Took 2 aleve at 1PM today without any improvement in symptoms so came to ED. Denies any fevers, chills, other symptoms.

## 2023-05-24 NOTE — ED PROVIDER NOTE - PROGRESS NOTE DETAILS
DAVID LOPEZ: sign out resident aubrie   pending ua   + wbc >50 and rbc   will initiate treatment for uti

## 2023-05-24 NOTE — ED PROVIDER NOTE - NS ED ROS FT
Gen: No fever, no change in activity level  :  No change in urine output; (+)dysuria, (+)hematuria  Remainder negative, except as per the HPI

## 2023-05-24 NOTE — ED ADULT NURSE NOTE - CHIEF COMPLAINT QUOTE
Pt arrives to ED c/o bloody urine since yesterday
Post-Care Instructions: I reviewed with the patient in detail post-care instructions. Patient is to wear sunprotection, and avoid picking at any of the treated lesions. Pt may apply Vaseline to crusted or scabbing areas.
Number Of Freeze-Thaw Cycles: 1 freeze-thaw cycle
Detail Level: Detailed
Render Post-Care Instructions In Note?: no
Consent: The patient's consent was obtained including but not limited to risks of crusting, scabbing, blistering, scarring, darker or lighter pigmentary change, recurrence, incomplete removal and infection.
Duration Of Freeze Thaw-Cycle (Seconds): 6

## 2023-05-24 NOTE — ED PROVIDER NOTE - PATIENT PORTAL LINK FT
You can access the FollowMyHealth Patient Portal offered by [facility] by registering at the following website: [email or web address]. By joining [employer]’s FollowMyHealth portal, you will also be able to view your health information using other applications (apps) compatible with our system.

## 2023-05-24 NOTE — ED PROVIDER NOTE - ATTENDING CONTRIBUTION TO CARE
32 year old female presenting with 3 days of hematuria and dysuria, with associated pelvic pain, no fever/chills. Will check UA.

## 2023-05-24 NOTE — ED PROVIDER NOTE - CLINICAL SUMMARY MEDICAL DECISION MAKING FREE TEXT BOX
32 year old female presenting with 3 days of hematuria and dysuria. 32 year old female presenting with 3 days of hematuria and dysuria, with associated pelvic pain, no fever/chills. Will check UA.

## 2023-06-06 NOTE — PATIENT PROFILE OB - BABY A: CORD CHARACTERISTICS, DELIVERY
She remains on appropriate secondary stroke prophylaxis with Plavix and Lipitor.  She has been stable for quite some time but will consider screening with carotid ultrasound at her next visit.  The last vascular imaging documented in her chart was a CT angiogram in 2018 which did not show any carotid stenosis at that time.   x 1/nuchal cord

## 2023-08-22 NOTE — PATIENT PROFILE OB - DELIVERY INFORMATION-PLACENTA STATUS, BABY A
[facility]. Compliance visit scheduled.   
delivered spontaneously/intact

## 2023-09-10 ENCOUNTER — EMERGENCY (EMERGENCY)
Facility: HOSPITAL | Age: 33
LOS: 1 days | Discharge: HOME | End: 2023-09-10
Attending: STUDENT IN AN ORGANIZED HEALTH CARE EDUCATION/TRAINING PROGRAM
Payer: COMMERCIAL

## 2023-09-10 VITALS
OXYGEN SATURATION: 100 % | TEMPERATURE: 99 F | SYSTOLIC BLOOD PRESSURE: 104 MMHG | HEART RATE: 73 BPM | DIASTOLIC BLOOD PRESSURE: 61 MMHG | RESPIRATION RATE: 16 BRPM

## 2023-09-10 VITALS
HEIGHT: 63 IN | RESPIRATION RATE: 20 BRPM | OXYGEN SATURATION: 100 % | HEART RATE: 80 BPM | DIASTOLIC BLOOD PRESSURE: 57 MMHG | WEIGHT: 169.09 LBS | SYSTOLIC BLOOD PRESSURE: 115 MMHG

## 2023-09-10 LAB
ALBUMIN SERPL ELPH-MCNC: 4.3 G/DL — SIGNIFICANT CHANGE UP (ref 3.3–5.2)
ALP SERPL-CCNC: 48 U/L — SIGNIFICANT CHANGE UP (ref 40–120)
ALT FLD-CCNC: 13 U/L — SIGNIFICANT CHANGE UP
ANION GAP SERPL CALC-SCNC: 18 MMOL/L — HIGH (ref 5–17)
AST SERPL-CCNC: 26 U/L — SIGNIFICANT CHANGE UP
BASOPHILS # BLD AUTO: 0.05 K/UL — SIGNIFICANT CHANGE UP (ref 0–0.2)
BASOPHILS NFR BLD AUTO: 0.4 % — SIGNIFICANT CHANGE UP (ref 0–2)
BILIRUB SERPL-MCNC: 0.8 MG/DL — SIGNIFICANT CHANGE UP (ref 0.4–2)
BUN SERPL-MCNC: 12.5 MG/DL — SIGNIFICANT CHANGE UP (ref 8–20)
CALCIUM SERPL-MCNC: 9.2 MG/DL — SIGNIFICANT CHANGE UP (ref 8.4–10.5)
CHLORIDE SERPL-SCNC: 98 MMOL/L — SIGNIFICANT CHANGE UP (ref 96–108)
CO2 SERPL-SCNC: 20 MMOL/L — LOW (ref 22–29)
CREAT SERPL-MCNC: 0.76 MG/DL — SIGNIFICANT CHANGE UP (ref 0.5–1.3)
D DIMER BLD IA.RAPID-MCNC: 158 NG/ML DDU — SIGNIFICANT CHANGE UP
EGFR: 105 ML/MIN/1.73M2 — SIGNIFICANT CHANGE UP
EOSINOPHIL # BLD AUTO: 0.01 K/UL — SIGNIFICANT CHANGE UP (ref 0–0.5)
EOSINOPHIL NFR BLD AUTO: 0.1 % — SIGNIFICANT CHANGE UP (ref 0–6)
GLUCOSE SERPL-MCNC: 67 MG/DL — LOW (ref 70–99)
HCG SERPL-ACNC: <4 MIU/ML — SIGNIFICANT CHANGE UP
HCT VFR BLD CALC: 38.2 % — SIGNIFICANT CHANGE UP (ref 34.5–45)
HGB BLD-MCNC: 12.8 G/DL — SIGNIFICANT CHANGE UP (ref 11.5–15.5)
IMM GRANULOCYTES NFR BLD AUTO: 0.4 % — SIGNIFICANT CHANGE UP (ref 0–0.9)
LYMPHOCYTES # BLD AUTO: 1.1 K/UL — SIGNIFICANT CHANGE UP (ref 1–3.3)
LYMPHOCYTES # BLD AUTO: 9.8 % — LOW (ref 13–44)
MCHC RBC-ENTMCNC: 30.5 PG — SIGNIFICANT CHANGE UP (ref 27–34)
MCHC RBC-ENTMCNC: 33.5 GM/DL — SIGNIFICANT CHANGE UP (ref 32–36)
MCV RBC AUTO: 91 FL — SIGNIFICANT CHANGE UP (ref 80–100)
MONOCYTES # BLD AUTO: 0.91 K/UL — HIGH (ref 0–0.9)
MONOCYTES NFR BLD AUTO: 8.1 % — SIGNIFICANT CHANGE UP (ref 2–14)
NEUTROPHILS # BLD AUTO: 9.09 K/UL — HIGH (ref 1.8–7.4)
NEUTROPHILS NFR BLD AUTO: 81.2 % — HIGH (ref 43–77)
PLATELET # BLD AUTO: 258 K/UL — SIGNIFICANT CHANGE UP (ref 150–400)
POTASSIUM SERPL-MCNC: 4.5 MMOL/L — SIGNIFICANT CHANGE UP (ref 3.5–5.3)
POTASSIUM SERPL-SCNC: 4.5 MMOL/L — SIGNIFICANT CHANGE UP (ref 3.5–5.3)
PROT SERPL-MCNC: 7 G/DL — SIGNIFICANT CHANGE UP (ref 6.6–8.7)
RBC # BLD: 4.2 M/UL — SIGNIFICANT CHANGE UP (ref 3.8–5.2)
RBC # FLD: 12.5 % — SIGNIFICANT CHANGE UP (ref 10.3–14.5)
SODIUM SERPL-SCNC: 136 MMOL/L — SIGNIFICANT CHANGE UP (ref 135–145)
TROPONIN T SERPL-MCNC: <0.01 NG/ML — SIGNIFICANT CHANGE UP (ref 0–0.06)
WBC # BLD: 11.21 K/UL — HIGH (ref 3.8–10.5)
WBC # FLD AUTO: 11.21 K/UL — HIGH (ref 3.8–10.5)

## 2023-09-10 PROCEDURE — 85025 COMPLETE CBC W/AUTO DIFF WBC: CPT

## 2023-09-10 PROCEDURE — 71045 X-RAY EXAM CHEST 1 VIEW: CPT | Mod: 26

## 2023-09-10 PROCEDURE — 36415 COLL VENOUS BLD VENIPUNCTURE: CPT

## 2023-09-10 PROCEDURE — 96374 THER/PROPH/DIAG INJ IV PUSH: CPT

## 2023-09-10 PROCEDURE — 93005 ELECTROCARDIOGRAM TRACING: CPT

## 2023-09-10 PROCEDURE — 84702 CHORIONIC GONADOTROPIN TEST: CPT

## 2023-09-10 PROCEDURE — 85379 FIBRIN DEGRADATION QUANT: CPT

## 2023-09-10 PROCEDURE — 71045 X-RAY EXAM CHEST 1 VIEW: CPT

## 2023-09-10 PROCEDURE — 84484 ASSAY OF TROPONIN QUANT: CPT

## 2023-09-10 PROCEDURE — 99285 EMERGENCY DEPT VISIT HI MDM: CPT

## 2023-09-10 PROCEDURE — 93010 ELECTROCARDIOGRAM REPORT: CPT

## 2023-09-10 PROCEDURE — 99285 EMERGENCY DEPT VISIT HI MDM: CPT | Mod: 25

## 2023-09-10 PROCEDURE — 80053 COMPREHEN METABOLIC PANEL: CPT

## 2023-09-10 RX ORDER — KETOROLAC TROMETHAMINE 30 MG/ML
15 SYRINGE (ML) INJECTION ONCE
Refills: 0 | Status: DISCONTINUED | OUTPATIENT
Start: 2023-09-10 | End: 2023-09-10

## 2023-09-10 RX ADMIN — Medication 15 MILLIGRAM(S): at 07:13

## 2023-09-10 NOTE — ED ADULT NURSE NOTE - DOES PATIENT HAVE ADVANCE DIRECTIVE
unknown Hydroquinone Pregnancy And Lactation Text: This medication has not been assigned a Pregnancy Risk Category but animal studies failed to show danger with the topical medication. It is unknown if the medication is excreted in breast milk.

## 2023-09-10 NOTE — ED PROVIDER NOTE - NSFOLLOWUPINSTRUCTIONS_ED_ALL_ED_FT
Chest Pain    WHAT YOU NEED TO KNOW:    What do I need to know about chest pain? Chest pain can be caused by a range of conditions, from not serious to life-threatening. It is important to follow up with your healthcare provider to find the cause of your chest pain.    What may cause or increase my risk for chest pain?    A digestion problem, such as acid reflux or a stomach ulcer    An anxiety attack or a strong emotion, such as anger    Infection, inflammation, or a fracture in the bones or cartilage in your chest    Poor blood flow to your heart (angina)    A life-threatening condition, such as a heart attack or blood clot in your lungs  What other symptoms might I have with chest pain?    A burning feeling behind your breastbone    A racing or slow heartbeat    Fever or sweating    Nausea or vomiting    Shortness of breath    Discomfort or pressure that spreads from your chest to your back, jaw, or arm    Feeling weak, tired, or faint  How is the cause of chest pain diagnosed? Your healthcare provider will examine you. Describe your chest pain in as much detail as possible. Tell him or her where your pain is and when it began. Tell the provider if you notice anything that makes the pain worse or better. Tell him or her if it is constant or comes and goes. Also include any other symptoms you have with the chest pain, such as sweating or nausea. Your provider will ask about any medicines you use and medical conditions you have. Tell him or her if you have a family history of heart disease. You may also need any of the following tests:    An EKG is a test that records your heart's electrical activity.    Blood tests check for heart damage and signs of a heart attack.    An echocardiogram uses sound waves to see if blood is flowing normally through your heart.    An ultrasound, x-ray, CT, or MRI scan may show the cause of your chest pain. You may be given contrast liquid to help your heart show up better in the pictures. Tell the healthcare provider if you have ever had an allergic reaction to contrast liquid. Do not enter the MRI room with anything metal. Metal can cause serious injury. Tell the provider if you have any metal in or on your body.    An endoscopy may be done to check for ulcers or problem with your esophagus.  Upper Endoscopy  How is chest pain treated?    Medicines may be given to treat the cause of your chest pain. Examples include pain medicine, anxiety medicine, or medicines to increase blood flow to your heart. Do not take certain medicines without asking your healthcare provider first. These include NSAIDs, herbal or vitamin supplements, and hormones, such as estrogen or progestin.    A stent may be placed if your chest pain is caused by blockage in your heart. A stent is a wire mesh tube that helps hold your artery open. You may need more than 1 stent.  Coronary Artery Angioplasty (Stent)  What are some healthy living tips? If the cause of your chest pain is known, your healthcare provider will give you specific guidelines to follow. The following are general healthy guidelines:    Do not smoke. Nicotine and other chemicals in cigarettes and cigars can cause lung and heart damage. Ask your healthcare provider for information if you currently smoke and need help to quit. E-cigarettes or smokeless tobacco still contain nicotine. Talk to your healthcare provider before you use these products.    Choose a variety of healthy foods as often as possible. Include fresh, frozen, or canned fruits and vegetables. Also include low-fat dairy products, fish, chicken (without skin), and lean meats. Your healthcare provider or a dietitian can help you create meal plans. You may need to avoid certain foods or drinks if your pain is caused by a digestion problem.  Healthy Foods      Lower your sodium (salt) intake. Limit foods that are high in sodium, such as canned foods, salty snacks, and cold cuts. If you add salt when you cook food, do not add more at the table. Choose low-sodium canned foods as much as possible.        Drink plenty of water every day. Water helps your body to control your temperature and blood pressure. Ask your healthcare provider how much water you should drink every day.    Ask about activity. Your healthcare provider will tell you which activities to limit or avoid. Ask when you can drive, return to work, and have sex. Ask about the best exercise plan for you.    Maintain a healthy weight. Ask your healthcare provider what a healthy weight is for you. Ask him or her to help you create a weight loss plan if you are overweight.    Ask about vaccines you may need. Your healthcare provider can tell you which vaccines you need, and when to get them. The following vaccines help prevent diseases that can become serious for a person with a heart condition:  The influenza (flu) vaccine is given each year. Get a flu vaccine as soon as recommended, usually in September or October.    The pneumonia vaccine is usually given every 5 years. Your healthcare provider may recommend the pneumonia vaccine if you are 65 or older.    COVID-19 vaccines are given to adults as a shot in 1 or 2 doses. Vaccination is recommended for all adults. A booster (additional) dose is also recommended for all adults. A second booster is recommended for all adults 50 or older and for immunocompromised adults 18 or older. The second booster is also recommended for adults who received the 1-dose vaccine for the first and booster doses. Your healthcare provider can tell you when to get one or both boosters.  Prevent Heart Disease   Call your local emergency number (911 in the [place]) or have someone call if:    You have any of the following signs of a heart attack:  Squeezing, pressure, or pain in your chest    You may also have any of the following:  Discomfort or pain in your back, neck, jaw, stomach, or arm    Shortness of breath    Nausea or vomiting    Lightheadedness or a sudden cold sweat    When should I seek immediate care?    You have chest discomfort that gets worse, even with medicine.    You cough or vomit blood.    Your bowel movements are black or bloody.    You cannot stop vomiting, or it hurts to swallow.  When should I call my doctor?    You have questions or concerns about your condition or care.    CARE AGREEMENT:    You have the right to help plan your care. Learn about your health condition and how it may be treated. Discuss treatment options with your healthcare providers to decide what care you want to receive. You always have the right to refuse treatment.

## 2023-09-10 NOTE — ED ADULT NURSE NOTE - NSFALLUNIVINTERV_ED_ALL_ED
Bed/Stretcher in lowest position, wheels locked, appropriate side rails in place/Call bell, personal items and telephone in reach/Instruct patient to call for assistance before getting out of bed/chair/stretcher/Non-slip footwear applied when patient is off stretcher/[place] to call system/Physically safe environment - no spills, clutter or unnecessary equipment/Purposeful proactive rounding/Room/bathroom lighting operational, light cord in reach

## 2023-09-10 NOTE — ED PROVIDER NOTE - OBJECTIVE STATEMENT
Pt is a 33 yo female with PMH of symptomatic bradycardia presenting with chest pain. Pt reports of substernal chest pain. Pt is a 31 yo female with PMH of symptomatic bradycardia presenting with chest pain. Pt reports of substernal chest pain starting tonight when she was lying down on the couch. Pt noted to have substernal squeezing chest pain that is reproducible on palpation. Pt reports that she noted on her smart watch that she was tachy up to 140s. Pt denies chest pain or SOB on exertion, fever, chills, abdominal pain, N/V/D. She uses birth control irregularly.

## 2023-09-10 NOTE — ED ADULT NURSE NOTE - OBJECTIVE STATEMENT
pt c/o midsternal cp after argument with  yesterday.  denies sob, nvd, diaphoresis, abd pain.  pt is aox4, ambulatory.  no s/s acute distress noted.

## 2023-09-10 NOTE — ED PROVIDER NOTE - CLINICAL SUMMARY MEDICAL DECISION MAKING FREE TEXT BOX
Pt is a 33 yo female with PMH of symptomatic bradycardia presenting with chest pain. Pt reports of substernal chest pain starting tonight when she was lying down on the couch. Pt noted to have substernal squeezing chest pain that is reproducible on palpation. Pt reports that she noted on her smart watch that she was tachy up to 140s. Pt denies chest pain or SOB on exertion, fever, chills, abdominal pain, N/V/D. She uses birth control irregularly. cbc, cmp, trop, d dimer, cxr, acetaminophen given for pain. ekg unremarkable. Pt is a 31 yo female with PMH of symptomatic bradycardia presenting with chest pain. Pt reports of substernal chest pain starting tonight when she was lying down on the couch. Pt noted to have substernal squeezing chest pain that is reproducible on palpation. Pt reports that she noted on her smart watch that she was tachy up to 140s. Pt denies chest pain or SOB on exertion, fever, chills, abdominal pain, N/V/D. She uses birth control irregularly. cbc, cmp, trop, d dimer, cxr, toradol given for pain. ekg unremarkable. Pt is a 33 yo female with PMH of symptomatic bradycardia presenting with chest pain. Pt reports of substernal chest pain starting tonight when she was lying down on the couch. Pt noted to have substernal squeezing chest pain that is reproducible on palpation. Pt reports that she noted on her smart watch that she was tachy up to 140s. Pt denies chest pain or SOB on exertion, fever, chills, abdominal pain, N/V/D. She uses birth control irregularly. cbc, cmp, trop, d dimer, cxr, toradol given for pain. ekg unremarkable. Likely due to panic attack vs musculoskeletal, pain reproducible on palpation. Pt is a 33 yo female with PMH of symptomatic bradycardia presenting with chest pain. Pt reports of substernal chest pain starting tonight when she was lying down on the couch. Pt noted to have substernal squeezing chest pain that is reproducible on palpation. Pt reports that she noted on her smart watch that she was tachy up to 140s. Pt denies chest pain or SOB on exertion, fever, chills, abdominal pain, N/V/D. She uses birth control irregularly. cmp, trop, d dimer, cxr pending, toradol given for pain. EKG unremarkable. Likely due to panic attack vs musculoskeletal, pain reproducible on palpation. Pt is a 33 yo female with PMH of symptomatic bradycardia presenting with chest pain. Pt reports of substernal chest pain starting tonight when she was lying down on the couch. Pt noted to have substernal squeezing chest pain that is reproducible on palpation. Pt reports that she noted on her smart watch that she was tachy up to 140s. Pt denies chest pain or SOB on exertion, fever, chills, abdominal pain, N/V/D. She uses birth control irregularly. cmp, trop, d dimer pending. EKG, cxr unremarkable. Likely due to panic attack vs musculoskeletal, pain reproducible on palpation. Toradol given for pain.

## 2023-09-10 NOTE — ED PROVIDER NOTE - ATTENDING CONTRIBUTION TO CARE
32y F w/ hx sinus bradycardia, presents for chest pain. Pt reports onset shortly prior to arrival of substernal chest discomfort. Says she noticed that her heart rate was steadily increasing as high as the 140s and got concerned. Started feeling short of breath with palpations. Currently improved but still having chest discomfort. No fever or cough, leg pain or swelling. Takes birth control. On exam, pt anxious but in no acute distress. +Reproducible tenderness over sternal area. Lungs clear, no calf tenderness. No acute findings on EKG, CXR and labs including negative D-dimer. Suspect MSK etiology of symptoms vs anxiety. Pt had unremarkable cardiac CTA 1 year ago -- low clinical suspicion for ACS. Pt felt improved with toradol. Stable for discharge with outpatient cardiology f/u.

## 2023-09-10 NOTE — ED PROVIDER NOTE - PATIENT PORTAL LINK FT
You can access the FollowMyHealth Patient Portal offered by [facility] by registering at the following website: [email or web address]. By joining [facility]’s FollowMyHealth portal, you will also be able to view your health information using other applications (apps) compatible with our system.

## 2023-09-10 NOTE — ED ADULT TRIAGE NOTE - CHIEF COMPLAINT QUOTE
patient c/o substernal chest pain while watching videos and laying down, reports having fight with  yesterday.  ems states pt was hyperventilating. pt states hx of bradycardia "I'm usually in the 40s-50s."

## 2023-10-23 ENCOUNTER — NON-APPOINTMENT (OUTPATIENT)
Age: 33
End: 2023-10-23

## 2023-10-24 ENCOUNTER — APPOINTMENT (OUTPATIENT)
Dept: OBGYN | Facility: CLINIC | Age: 33
End: 2023-10-24
Payer: COMMERCIAL

## 2023-10-24 VITALS
WEIGHT: 174.4 LBS | SYSTOLIC BLOOD PRESSURE: 120 MMHG | BODY MASS INDEX: 30.9 KG/M2 | HEIGHT: 63 IN | DIASTOLIC BLOOD PRESSURE: 62 MMHG

## 2023-10-24 PROCEDURE — 81025 URINE PREGNANCY TEST: CPT

## 2023-10-24 PROCEDURE — 99203 OFFICE O/P NEW LOW 30 MIN: CPT

## 2023-10-24 RX ORDER — CHOLECALCIFEROL (VITAMIN D3) 25 MCG
27-0.8-228 TABLET,CHEWABLE ORAL DAILY
Qty: 90 | Refills: 3 | Status: ACTIVE | COMMUNITY
Start: 2023-10-24 | End: 1900-01-01

## 2023-10-25 ENCOUNTER — APPOINTMENT (OUTPATIENT)
Dept: ANTEPARTUM | Facility: CLINIC | Age: 33
End: 2023-10-25
Payer: COMMERCIAL

## 2023-10-25 ENCOUNTER — ASOB RESULT (OUTPATIENT)
Age: 33
End: 2023-10-25

## 2023-10-25 PROCEDURE — 76801 OB US < 14 WKS SINGLE FETUS: CPT

## 2023-10-26 LAB
BACTERIA UR CULT: NORMAL
C TRACH RRNA SPEC QL NAA+PROBE: NOT DETECTED
HCG UR QL: POSITIVE
N GONORRHOEA RRNA SPEC QL NAA+PROBE: NOT DETECTED
QUALITY CONTROL: YES
SOURCE AMPLIFICATION: NORMAL

## 2023-11-09 ENCOUNTER — APPOINTMENT (OUTPATIENT)
Dept: OBGYN | Facility: CLINIC | Age: 33
End: 2023-11-09
Payer: COMMERCIAL

## 2023-11-09 ENCOUNTER — APPOINTMENT (OUTPATIENT)
Dept: ANTEPARTUM | Facility: CLINIC | Age: 33
End: 2023-11-09
Payer: COMMERCIAL

## 2023-11-09 ENCOUNTER — ASOB RESULT (OUTPATIENT)
Age: 33
End: 2023-11-09

## 2023-11-09 VITALS
BODY MASS INDEX: 31.18 KG/M2 | SYSTOLIC BLOOD PRESSURE: 100 MMHG | HEIGHT: 63 IN | DIASTOLIC BLOOD PRESSURE: 60 MMHG | WEIGHT: 176 LBS

## 2023-11-09 LAB
APPEARANCE: CLEAR
BILIRUBIN URINE: NEGATIVE
BLOOD URINE: NEGATIVE
COLOR: YELLOW
GLUCOSE QUALITATIVE U: NEGATIVE
KETONES URINE: NEGATIVE
LEUKOCYTE ESTERASE URINE: NEGATIVE
NITRITE URINE: NEGATIVE
PH URINE: 6
PROTEIN URINE: NEGATIVE
SPECIFIC GRAVITY URINE: <=1.005
UROBILINOGEN URINE: 0.2

## 2023-11-09 PROCEDURE — 36415 COLL VENOUS BLD VENIPUNCTURE: CPT

## 2023-11-09 PROCEDURE — 0500F INITIAL PRENATAL CARE VISIT: CPT

## 2023-11-09 PROCEDURE — 76813 OB US NUCHAL MEAS 1 GEST: CPT

## 2023-11-09 RX ORDER — PRENATAL VIT NO.130/IRON/FOLIC 27MG-0.8MG
28-0.8 TABLET ORAL
Qty: 30 | Refills: 8 | Status: ACTIVE | COMMUNITY
Start: 2023-11-09 | End: 1900-01-01

## 2023-11-14 ENCOUNTER — NON-APPOINTMENT (OUTPATIENT)
Age: 33
End: 2023-11-14

## 2023-11-21 ENCOUNTER — NON-APPOINTMENT (OUTPATIENT)
Age: 33
End: 2023-11-21

## 2023-11-28 ENCOUNTER — NON-APPOINTMENT (OUTPATIENT)
Age: 33
End: 2023-11-28

## 2023-11-28 DIAGNOSIS — Z78.9 OTHER SPECIFIED HEALTH STATUS: ICD-10-CM

## 2023-11-28 DIAGNOSIS — R76.12 NONSPECIFIC REACTION TO CELL MEDIATED IMMUNITY MEASUREMENT OF GAMMA INTERFERON ANTIGEN RESPONSE W/OUT ACTIVE TUBERCULOSIS: ICD-10-CM

## 2023-11-28 LAB
ABO + RH PNL BLD: NORMAL
BACTERIA UR CULT: NORMAL
BASOPHILS # BLD AUTO: 0.03 K/UL
BASOPHILS NFR BLD AUTO: 0.3 %
BLD GP AB SCN SERPL QL: NORMAL
EOSINOPHIL # BLD AUTO: 0.04 K/UL
EOSINOPHIL NFR BLD AUTO: 0.4 %
ESTIMATED AVERAGE GLUCOSE: 100 MG/DL
HBA1C MFR BLD HPLC: 5.1 %
HBV SURFACE AG SER QL: NONREACTIVE
HCT VFR BLD CALC: 42.2 %
HGB BLD-MCNC: 13.3 G/DL
HIV1+2 AB SPEC QL IA.RAPID: NONREACTIVE
IMM GRANULOCYTES NFR BLD AUTO: 0.5 %
LYMPHOCYTES # BLD AUTO: 1.26 K/UL
LYMPHOCYTES NFR BLD AUTO: 11.6 %
M TB IFN-G BLD-IMP: ABNORMAL
MAN DIFF?: NORMAL
MCHC RBC-ENTMCNC: 30.8 PG
MCHC RBC-ENTMCNC: 31.5 GM/DL
MCV RBC AUTO: 97.7 FL
MEV IGG FLD QL IA: 6.5 AU/ML
MEV IGG+IGM SER-IMP: NEGATIVE
MONOCYTES # BLD AUTO: 0.55 K/UL
MONOCYTES NFR BLD AUTO: 5.1 %
NEUTROPHILS # BLD AUTO: 8.94 K/UL
NEUTROPHILS NFR BLD AUTO: 82.1 %
PLATELET # BLD AUTO: 293 K/UL
QUANTIFERON TB PLUS MITOGEN MINUS NIL: 0.22 IU/ML
QUANTIFERON TB PLUS NIL: 0.01 IU/ML
QUANTIFERON TB PLUS TB1 MINUS NIL: 0 IU/ML
QUANTIFERON TB PLUS TB2 MINUS NIL: 0 IU/ML
RBC # BLD: 4.32 M/UL
RBC # FLD: 12.6 %
RUBV IGG FLD-ACNC: 11.2 INDEX
RUBV IGG SER-IMP: POSITIVE
T PALLIDUM AB SER QL IA: NEGATIVE
TSH SERPL-ACNC: 2.25 UIU/ML
VZV AB TITR SER: POSITIVE
VZV IGG SER IF-ACNC: 899.4 INDEX
WBC # FLD AUTO: 10.87 K/UL

## 2023-12-05 ENCOUNTER — NON-APPOINTMENT (OUTPATIENT)
Age: 33
End: 2023-12-05

## 2023-12-07 ENCOUNTER — APPOINTMENT (OUTPATIENT)
Dept: OBGYN | Facility: CLINIC | Age: 33
End: 2023-12-07
Payer: COMMERCIAL

## 2023-12-07 VITALS
SYSTOLIC BLOOD PRESSURE: 108 MMHG | DIASTOLIC BLOOD PRESSURE: 60 MMHG | HEIGHT: 63 IN | BODY MASS INDEX: 31.36 KG/M2 | WEIGHT: 177 LBS

## 2023-12-07 DIAGNOSIS — N89.8 OTHER SPECIFIED NONINFLAMMATORY DISORDERS OF VAGINA: ICD-10-CM

## 2023-12-07 LAB
APPEARANCE: CLEAR
BILIRUBIN URINE: NEGATIVE
BLOOD URINE: NEGATIVE
COLOR: YELLOW
GLUCOSE QUALITATIVE U: NEGATIVE
KETONES URINE: NEGATIVE
LEUKOCYTE ESTERASE URINE: NEGATIVE
NITRITE URINE: NEGATIVE
PH URINE: 6.5
PROTEIN URINE: NEGATIVE
SPECIFIC GRAVITY URINE: 1.01
UROBILINOGEN URINE: 0.2 (ref 0.2–?)

## 2023-12-07 PROCEDURE — 36415 COLL VENOUS BLD VENIPUNCTURE: CPT

## 2023-12-07 PROCEDURE — 0502F SUBSEQUENT PRENATAL CARE: CPT

## 2023-12-07 RX ORDER — FLUCONAZOLE 150 MG/1
150 TABLET ORAL
Qty: 2 | Refills: 0 | Status: ACTIVE | COMMUNITY
Start: 2023-12-07 | End: 1900-01-01

## 2023-12-11 LAB
AFP MOM: 1.19
AFP VALUE: 36.7 NG/ML
ALPHA FETOPROTEIN SERUM COMMENT: NORMAL
ALPHA FETOPROTEIN SERUM INTERPRETATION: NORMAL
ALPHA FETOPROTEIN SERUM RESULTS: NORMAL
ALPHA FETOPROTEIN SERUM TEST RESULTS: NORMAL
GESTATIONAL AGE BASED ON: NORMAL
GESTATIONAL AGE ON COLLECTION DATE: 16.1 WEEKS
INSULIN DEP DIABETES: NO
M TB IFN-G BLD-IMP: NEGATIVE
MATERNAL AGE AT EDD AFP: 35.6 YR
MULTIPLE GESTATION: NO
OSBR RISK 1 IN: 6704
QUANTIFERON TB PLUS MITOGEN MINUS NIL: 1.01 IU/ML
QUANTIFERON TB PLUS NIL: 0.02 IU/ML
QUANTIFERON TB PLUS TB1 MINUS NIL: -0.01 IU/ML
QUANTIFERON TB PLUS TB2 MINUS NIL: -0.01 IU/ML
RACE: NORMAL
WEIGHT AFP: 177 LBS

## 2023-12-19 ENCOUNTER — NON-APPOINTMENT (OUTPATIENT)
Age: 33
End: 2023-12-19

## 2024-01-04 ENCOUNTER — NON-APPOINTMENT (OUTPATIENT)
Age: 34
End: 2024-01-04

## 2024-01-04 ENCOUNTER — APPOINTMENT (OUTPATIENT)
Dept: OBGYN | Facility: CLINIC | Age: 34
End: 2024-01-04
Payer: COMMERCIAL

## 2024-01-04 VITALS
HEIGHT: 63 IN | DIASTOLIC BLOOD PRESSURE: 58 MMHG | SYSTOLIC BLOOD PRESSURE: 102 MMHG | BODY MASS INDEX: 31.96 KG/M2 | WEIGHT: 180.38 LBS

## 2024-01-04 LAB
APPEARANCE: CLEAR
BILIRUBIN URINE: NEGATIVE
BLOOD URINE: NEGATIVE
COLOR: YELLOW
GLUCOSE QUALITATIVE U: NEGATIVE
KETONES URINE: NEGATIVE
LEUKOCYTE ESTERASE URINE: NEGATIVE
NITRITE URINE: NEGATIVE
PH URINE: 7
PROTEIN URINE: ABNORMAL
SPECIFIC GRAVITY URINE: 1.02
UROBILINOGEN URINE: 0.2 (ref 0.2–?)

## 2024-01-04 PROCEDURE — 0502F SUBSEQUENT PRENATAL CARE: CPT

## 2024-01-10 ENCOUNTER — ASOB RESULT (OUTPATIENT)
Age: 34
End: 2024-01-10

## 2024-01-10 ENCOUNTER — APPOINTMENT (OUTPATIENT)
Dept: ANTEPARTUM | Facility: CLINIC | Age: 34
End: 2024-01-10
Payer: COMMERCIAL

## 2024-01-10 PROCEDURE — 76811 OB US DETAILED SNGL FETUS: CPT

## 2024-01-10 PROCEDURE — 76817 TRANSVAGINAL US OBSTETRIC: CPT

## 2024-01-17 ENCOUNTER — ASOB RESULT (OUTPATIENT)
Age: 34
End: 2024-01-17

## 2024-01-17 ENCOUNTER — APPOINTMENT (OUTPATIENT)
Dept: ANTEPARTUM | Facility: CLINIC | Age: 34
End: 2024-01-17
Payer: COMMERCIAL

## 2024-01-17 PROCEDURE — 76816 OB US FOLLOW-UP PER FETUS: CPT

## 2024-02-01 ENCOUNTER — APPOINTMENT (OUTPATIENT)
Dept: OBGYN | Facility: CLINIC | Age: 34
End: 2024-02-01
Payer: COMMERCIAL

## 2024-02-01 VITALS
BODY MASS INDEX: 32.32 KG/M2 | DIASTOLIC BLOOD PRESSURE: 62 MMHG | SYSTOLIC BLOOD PRESSURE: 110 MMHG | WEIGHT: 182.4 LBS | HEIGHT: 63 IN

## 2024-02-01 LAB
APPEARANCE: CLEAR
BILIRUBIN URINE: NEGATIVE
BLOOD URINE: NEGATIVE
COLOR: YELLOW
GLUCOSE QUALITATIVE U: NEGATIVE
KETONES URINE: NEGATIVE
LEUKOCYTE ESTERASE URINE: NEGATIVE
NITRITE URINE: NEGATIVE
PH URINE: 8.5
PROTEIN URINE: 30
SPECIFIC GRAVITY URINE: 1.02
UROBILINOGEN URINE: 1 (ref 0.2–?)

## 2024-02-01 PROCEDURE — 36415 COLL VENOUS BLD VENIPUNCTURE: CPT

## 2024-02-01 PROCEDURE — 0502F SUBSEQUENT PRENATAL CARE: CPT

## 2024-02-12 LAB
BASOPHILS # BLD AUTO: 0.03 K/UL
BASOPHILS NFR BLD AUTO: 0.3 %
EOSINOPHIL # BLD AUTO: 0.06 K/UL
EOSINOPHIL NFR BLD AUTO: 0.6 %
GLUCOSE 1H P 50 G GLC PO SERPL-MCNC: 130 MG/DL
HCT VFR BLD CALC: 36.3 %
HGB BLD-MCNC: 11.4 G/DL
IMM GRANULOCYTES NFR BLD AUTO: 1.9 %
LYMPHOCYTES # BLD AUTO: 1.09 K/UL
LYMPHOCYTES NFR BLD AUTO: 10.3 %
MAN DIFF?: NORMAL
MCHC RBC-ENTMCNC: 30.2 PG
MCHC RBC-ENTMCNC: 31.4 GM/DL
MCV RBC AUTO: 96 FL
MONOCYTES # BLD AUTO: 0.68 K/UL
MONOCYTES NFR BLD AUTO: 6.4 %
NEUTROPHILS # BLD AUTO: 8.49 K/UL
NEUTROPHILS NFR BLD AUTO: 80.5 %
PLATELET # BLD AUTO: 246 K/UL
RBC # BLD: 3.78 M/UL
RBC # FLD: 14 %
T PALLIDUM AB SER QL IA: NEGATIVE
WBC # FLD AUTO: 10.55 K/UL

## 2024-02-20 ENCOUNTER — NON-APPOINTMENT (OUTPATIENT)
Age: 34
End: 2024-02-20

## 2024-02-21 ENCOUNTER — APPOINTMENT (OUTPATIENT)
Dept: OBGYN | Facility: CLINIC | Age: 34
End: 2024-02-21
Payer: COMMERCIAL

## 2024-02-21 VITALS
WEIGHT: 189.13 LBS | DIASTOLIC BLOOD PRESSURE: 60 MMHG | BODY MASS INDEX: 33.51 KG/M2 | SYSTOLIC BLOOD PRESSURE: 112 MMHG | HEIGHT: 63 IN

## 2024-02-21 LAB
APPEARANCE: CLEAR
BILIRUBIN URINE: NEGATIVE
BLOOD URINE: NEGATIVE
COLOR: YELLOW
GLUCOSE QUALITATIVE U: NEGATIVE
KETONES URINE: NEGATIVE
LEUKOCYTE ESTERASE URINE: NEGATIVE
NITRITE URINE: NEGATIVE
PH URINE: 7
PROTEIN URINE: NEGATIVE
SPECIFIC GRAVITY URINE: 1.01
UROBILINOGEN URINE: 0.2 (ref 0.2–?)

## 2024-02-21 PROCEDURE — 0502F SUBSEQUENT PRENATAL CARE: CPT

## 2024-02-28 ENCOUNTER — APPOINTMENT (OUTPATIENT)
Dept: ANTEPARTUM | Facility: CLINIC | Age: 34
End: 2024-02-28
Payer: COMMERCIAL

## 2024-02-28 ENCOUNTER — ASOB RESULT (OUTPATIENT)
Age: 34
End: 2024-02-28

## 2024-02-28 PROCEDURE — 76816 OB US FOLLOW-UP PER FETUS: CPT

## 2024-02-29 ENCOUNTER — NON-APPOINTMENT (OUTPATIENT)
Age: 34
End: 2024-02-29

## 2024-03-14 ENCOUNTER — APPOINTMENT (OUTPATIENT)
Dept: OBGYN | Facility: CLINIC | Age: 34
End: 2024-03-14
Payer: COMMERCIAL

## 2024-03-14 VITALS
SYSTOLIC BLOOD PRESSURE: 110 MMHG | HEIGHT: 63 IN | DIASTOLIC BLOOD PRESSURE: 60 MMHG | BODY MASS INDEX: 34.2 KG/M2 | WEIGHT: 193 LBS

## 2024-03-14 DIAGNOSIS — Z23 ENCOUNTER FOR IMMUNIZATION: ICD-10-CM

## 2024-03-14 DIAGNOSIS — Z11.3 ENCOUNTER FOR SCREENING FOR INFECTIONS WITH A PREDOMINANTLY SEXUAL MODE OF TRANSMISSION: ICD-10-CM

## 2024-03-14 DIAGNOSIS — R04.0 EPISTAXIS: ICD-10-CM

## 2024-03-14 DIAGNOSIS — N91.1 SECONDARY AMENORRHEA: ICD-10-CM

## 2024-03-14 DIAGNOSIS — Z34.92 ENCOUNTER FOR SUPERVISION OF NORMAL PREGNANCY, UNSPECIFIED, SECOND TRIMESTER: ICD-10-CM

## 2024-03-14 DIAGNOSIS — Z32.01 ENCOUNTER FOR PREGNANCY TEST, RESULT POSITIVE: ICD-10-CM

## 2024-03-14 DIAGNOSIS — Z34.91 ENCOUNTER FOR SUPERVISION OF NORMAL PREGNANCY, UNSPECIFIED, FIRST TRIMESTER: ICD-10-CM

## 2024-03-14 PROCEDURE — 90715 TDAP VACCINE 7 YRS/> IM: CPT

## 2024-03-14 PROCEDURE — 36415 COLL VENOUS BLD VENIPUNCTURE: CPT

## 2024-03-14 PROCEDURE — 0502F SUBSEQUENT PRENATAL CARE: CPT

## 2024-03-14 PROCEDURE — 90471 IMMUNIZATION ADMIN: CPT

## 2024-03-15 LAB
HCT VFR BLD CALC: 38 %
HGB BLD-MCNC: 11.9 G/DL
MCHC RBC-ENTMCNC: 30.9 PG
MCHC RBC-ENTMCNC: 31.3 GM/DL
MCV RBC AUTO: 98.7 FL
PLATELET # BLD AUTO: 254 K/UL
RBC # BLD: 3.85 M/UL
RBC # FLD: 13.8 %
WBC # FLD AUTO: 12.58 K/UL

## 2024-03-27 ENCOUNTER — APPOINTMENT (OUTPATIENT)
Dept: OBGYN | Facility: CLINIC | Age: 34
End: 2024-03-27
Payer: COMMERCIAL

## 2024-03-27 LAB
APPEARANCE: CLEAR
BILIRUBIN URINE: NEGATIVE
BLOOD URINE: NEGATIVE
COLOR: YELLOW
GLUCOSE QUALITATIVE U: NEGATIVE
KETONES URINE: ABNORMAL
LEUKOCYTE ESTERASE URINE: NEGATIVE
NITRITE URINE: NEGATIVE
PH URINE: 6.5
PROTEIN URINE: 30
SPECIFIC GRAVITY URINE: 1.01
UROBILINOGEN URINE: 0.2 (ref 0.2–?)

## 2024-03-27 PROCEDURE — 0502F SUBSEQUENT PRENATAL CARE: CPT

## 2024-03-28 ENCOUNTER — APPOINTMENT (OUTPATIENT)
Dept: ANTEPARTUM | Facility: CLINIC | Age: 34
End: 2024-03-28
Payer: COMMERCIAL

## 2024-03-28 ENCOUNTER — ASOB RESULT (OUTPATIENT)
Age: 34
End: 2024-03-28

## 2024-03-28 PROCEDURE — 76816 OB US FOLLOW-UP PER FETUS: CPT

## 2024-04-08 NOTE — ED ADULT TRIAGE NOTE - AS HEIGHT TYPE
Patient calls reporting that he has upcoming appointment with Dr Goodson but now has medicare and is wanting to switch to medicare wellness visit. Patient is advised that he will need to arrive 30 minutes earlier for nurse visit. Patient is agreeable and he is transferred to  to adjust appointment times.   Patient also questions need to fasting labs prior.     Patient requesting PSA. Other labs placed will be CMP and Lipid panel.       
stated

## 2024-04-09 ENCOUNTER — NON-APPOINTMENT (OUTPATIENT)
Age: 34
End: 2024-04-09

## 2024-04-09 ENCOUNTER — APPOINTMENT (OUTPATIENT)
Dept: OBGYN | Facility: CLINIC | Age: 34
End: 2024-04-09
Payer: COMMERCIAL

## 2024-04-09 PROCEDURE — 0502F SUBSEQUENT PRENATAL CARE: CPT

## 2024-04-23 ENCOUNTER — APPOINTMENT (OUTPATIENT)
Dept: OBGYN | Facility: CLINIC | Age: 34
End: 2024-04-23
Payer: COMMERCIAL

## 2024-04-23 VITALS
BODY MASS INDEX: 34.91 KG/M2 | HEIGHT: 63 IN | WEIGHT: 197 LBS | SYSTOLIC BLOOD PRESSURE: 112 MMHG | DIASTOLIC BLOOD PRESSURE: 66 MMHG

## 2024-04-23 PROCEDURE — 0502F SUBSEQUENT PRENATAL CARE: CPT

## 2024-04-23 PROCEDURE — 36415 COLL VENOUS BLD VENIPUNCTURE: CPT

## 2024-04-24 LAB
HCT VFR BLD CALC: 37.1 %
HCV AB SER QL: NONREACTIVE
HCV S/CO RATIO: 0.08 S/CO
HGB BLD-MCNC: 11.8 G/DL
HIV1+2 AB SPEC QL IA.RAPID: NONREACTIVE
MCHC RBC-ENTMCNC: 30 PG
MCHC RBC-ENTMCNC: 31.8 GM/DL
MCV RBC AUTO: 94.4 FL
PLATELET # BLD AUTO: 230 K/UL
RBC # BLD: 3.93 M/UL
RBC # FLD: 13.7 %
T PALLIDUM AB SER QL IA: NEGATIVE
WBC # FLD AUTO: 10.86 K/UL

## 2024-04-25 ENCOUNTER — APPOINTMENT (OUTPATIENT)
Dept: ANTEPARTUM | Facility: CLINIC | Age: 34
End: 2024-04-25
Payer: COMMERCIAL

## 2024-04-25 ENCOUNTER — ASOB RESULT (OUTPATIENT)
Age: 34
End: 2024-04-25

## 2024-04-25 LAB — B-HEM STREP SPEC QL CULT: ABNORMAL

## 2024-04-25 PROCEDURE — 76816 OB US FOLLOW-UP PER FETUS: CPT

## 2024-04-25 PROCEDURE — ZZZZZ: CPT

## 2024-04-25 PROCEDURE — 76818 FETAL BIOPHYS PROFILE W/NST: CPT

## 2024-04-26 ENCOUNTER — NON-APPOINTMENT (OUTPATIENT)
Age: 34
End: 2024-04-26

## 2024-04-30 ENCOUNTER — APPOINTMENT (OUTPATIENT)
Dept: OBGYN | Facility: CLINIC | Age: 34
End: 2024-04-30
Payer: COMMERCIAL

## 2024-04-30 VITALS
WEIGHT: 198.13 LBS | HEIGHT: 63 IN | SYSTOLIC BLOOD PRESSURE: 102 MMHG | DIASTOLIC BLOOD PRESSURE: 60 MMHG | BODY MASS INDEX: 35.11 KG/M2

## 2024-04-30 PROCEDURE — 0502F SUBSEQUENT PRENATAL CARE: CPT

## 2024-05-02 ENCOUNTER — ASOB RESULT (OUTPATIENT)
Age: 34
End: 2024-05-02

## 2024-05-02 ENCOUNTER — APPOINTMENT (OUTPATIENT)
Dept: ANTEPARTUM | Facility: CLINIC | Age: 34
End: 2024-05-02
Payer: COMMERCIAL

## 2024-05-02 PROCEDURE — 76818 FETAL BIOPHYS PROFILE W/NST: CPT

## 2024-05-07 ENCOUNTER — NON-APPOINTMENT (OUTPATIENT)
Age: 34
End: 2024-05-07

## 2024-05-09 ENCOUNTER — APPOINTMENT (OUTPATIENT)
Dept: ANTEPARTUM | Facility: CLINIC | Age: 34
End: 2024-05-09
Payer: COMMERCIAL

## 2024-05-09 ENCOUNTER — APPOINTMENT (OUTPATIENT)
Dept: OBGYN | Facility: CLINIC | Age: 34
End: 2024-05-09
Payer: COMMERCIAL

## 2024-05-09 ENCOUNTER — ASOB RESULT (OUTPATIENT)
Age: 34
End: 2024-05-09

## 2024-05-09 ENCOUNTER — APPOINTMENT (OUTPATIENT)
Dept: ANTEPARTUM | Facility: CLINIC | Age: 34
End: 2024-05-09

## 2024-05-09 VITALS
DIASTOLIC BLOOD PRESSURE: 62 MMHG | WEIGHT: 201 LBS | BODY MASS INDEX: 35.61 KG/M2 | HEIGHT: 63 IN | SYSTOLIC BLOOD PRESSURE: 122 MMHG

## 2024-05-09 DIAGNOSIS — O09.513 SUPERVISION OF ELDERLY PRIMIGRAVIDA, THIRD TRIMESTER: ICD-10-CM

## 2024-05-09 LAB
APPEARANCE: CLEAR
BILIRUBIN URINE: NEGATIVE
BLOOD URINE: NEGATIVE
COLOR: YELLOW
GLUCOSE QUALITATIVE U: NEGATIVE
KETONES URINE: NEGATIVE
LEUKOCYTE ESTERASE URINE: NEGATIVE
NITRITE URINE: NEGATIVE
PH URINE: 7
PROTEIN URINE: NEGATIVE
SPECIFIC GRAVITY URINE: 1.02
UROBILINOGEN URINE: 0.2 (ref 0.2–?)

## 2024-05-09 PROCEDURE — 0502F SUBSEQUENT PRENATAL CARE: CPT

## 2024-05-09 PROCEDURE — 59025 FETAL NON-STRESS TEST: CPT

## 2024-05-09 PROCEDURE — 76819 FETAL BIOPHYS PROFIL W/O NST: CPT

## 2024-05-15 ENCOUNTER — NON-APPOINTMENT (OUTPATIENT)
Age: 34
End: 2024-05-15

## 2024-05-16 ENCOUNTER — APPOINTMENT (OUTPATIENT)
Dept: OBGYN | Facility: CLINIC | Age: 34
End: 2024-05-16
Payer: COMMERCIAL

## 2024-05-16 ENCOUNTER — APPOINTMENT (OUTPATIENT)
Dept: ANTEPARTUM | Facility: CLINIC | Age: 34
End: 2024-05-16

## 2024-05-16 ENCOUNTER — ASOB RESULT (OUTPATIENT)
Age: 34
End: 2024-05-16

## 2024-05-16 ENCOUNTER — APPOINTMENT (OUTPATIENT)
Dept: ANTEPARTUM | Facility: CLINIC | Age: 34
End: 2024-05-16
Payer: COMMERCIAL

## 2024-05-16 VITALS
SYSTOLIC BLOOD PRESSURE: 110 MMHG | HEIGHT: 63 IN | WEIGHT: 204 LBS | DIASTOLIC BLOOD PRESSURE: 70 MMHG | BODY MASS INDEX: 36.14 KG/M2

## 2024-05-16 LAB
APPEARANCE: CLEAR
BILIRUBIN URINE: NEGATIVE
BLOOD URINE: NEGATIVE
COLOR: YELLOW
GLUCOSE QUALITATIVE U: NEGATIVE
KETONES URINE: 15
LEUKOCYTE ESTERASE URINE: NEGATIVE
NITRITE URINE: NEGATIVE
PH URINE: 7
PROTEIN URINE: NEGATIVE
SPECIFIC GRAVITY URINE: 1.01
UROBILINOGEN URINE: 0.2 (ref 0.2–?)

## 2024-05-16 PROCEDURE — 59025 FETAL NON-STRESS TEST: CPT

## 2024-05-16 PROCEDURE — 0502F SUBSEQUENT PRENATAL CARE: CPT

## 2024-05-16 PROCEDURE — 76819 FETAL BIOPHYS PROFIL W/O NST: CPT

## 2024-05-22 ENCOUNTER — NON-APPOINTMENT (OUTPATIENT)
Age: 34
End: 2024-05-22

## 2024-05-23 ENCOUNTER — APPOINTMENT (OUTPATIENT)
Dept: OBGYN | Facility: CLINIC | Age: 34
End: 2024-05-23
Payer: COMMERCIAL

## 2024-05-23 ENCOUNTER — ASOB RESULT (OUTPATIENT)
Age: 34
End: 2024-05-23

## 2024-05-23 ENCOUNTER — APPOINTMENT (OUTPATIENT)
Dept: ANTEPARTUM | Facility: CLINIC | Age: 34
End: 2024-05-23
Payer: COMMERCIAL

## 2024-05-23 VITALS
SYSTOLIC BLOOD PRESSURE: 110 MMHG | HEIGHT: 63 IN | WEIGHT: 202 LBS | DIASTOLIC BLOOD PRESSURE: 69 MMHG | BODY MASS INDEX: 35.79 KG/M2

## 2024-05-23 DIAGNOSIS — Z34.93 ENCOUNTER FOR SUPERVISION OF NORMAL PREGNANCY, UNSPECIFIED, THIRD TRIMESTER: ICD-10-CM

## 2024-05-23 PROCEDURE — 59025 FETAL NON-STRESS TEST: CPT

## 2024-05-23 PROCEDURE — 76816 OB US FOLLOW-UP PER FETUS: CPT

## 2024-05-23 PROCEDURE — 0502F SUBSEQUENT PRENATAL CARE: CPT

## 2024-05-23 PROCEDURE — 76819 FETAL BIOPHYS PROFIL W/O NST: CPT

## 2024-05-23 RX ORDER — CHLORHEXIDINE GLUCONATE 213 G/1000ML
1 SOLUTION TOPICAL DAILY
Refills: 0 | Status: DISCONTINUED | OUTPATIENT
Start: 2024-05-25 | End: 2024-05-26

## 2024-05-23 RX ORDER — OXYTOCIN 10 UNIT/ML
VIAL (ML) INJECTION
Qty: 20 | Refills: 0 | Status: COMPLETED | OUTPATIENT
Start: 2024-05-25 | End: 2024-05-25

## 2024-05-23 RX ORDER — SODIUM CHLORIDE 9 MG/ML
1000 INJECTION, SOLUTION INTRAVENOUS
Refills: 0 | Status: DISCONTINUED | OUTPATIENT
Start: 2024-05-25 | End: 2024-05-26

## 2024-05-23 RX ORDER — AMPICILLIN TRIHYDRATE 250 MG
1 CAPSULE ORAL EVERY 4 HOURS
Refills: 0 | Status: DISCONTINUED | OUTPATIENT
Start: 2024-05-25 | End: 2024-05-26

## 2024-05-23 RX ORDER — CITRIC ACID/SODIUM CITRATE 300-500 MG
30 SOLUTION, ORAL ORAL ONCE
Refills: 0 | Status: DISCONTINUED | OUTPATIENT
Start: 2024-05-25 | End: 2024-05-26

## 2024-05-25 ENCOUNTER — APPOINTMENT (OUTPATIENT)
Dept: OBGYN | Facility: HOSPITAL | Age: 34
End: 2024-05-25

## 2024-05-25 ENCOUNTER — INPATIENT (INPATIENT)
Facility: HOSPITAL | Age: 34
LOS: 2 days | Discharge: HOME | DRG: 833 | End: 2024-05-28
Attending: STUDENT IN AN ORGANIZED HEALTH CARE EDUCATION/TRAINING PROGRAM | Admitting: STUDENT IN AN ORGANIZED HEALTH CARE EDUCATION/TRAINING PROGRAM
Payer: COMMERCIAL

## 2024-05-25 VITALS
HEART RATE: 72 BPM | TEMPERATURE: 98 F | SYSTOLIC BLOOD PRESSURE: 118 MMHG | RESPIRATION RATE: 14 BRPM | DIASTOLIC BLOOD PRESSURE: 68 MMHG

## 2024-05-25 DIAGNOSIS — O48.0 POST-TERM PREGNANCY: ICD-10-CM

## 2024-05-25 LAB
BASOPHILS # BLD AUTO: 0.02 K/UL — SIGNIFICANT CHANGE UP (ref 0–0.2)
BASOPHILS NFR BLD AUTO: 0.2 % — SIGNIFICANT CHANGE UP (ref 0–2)
BLD GP AB SCN SERPL QL: SIGNIFICANT CHANGE UP
EOSINOPHIL # BLD AUTO: 0.02 K/UL — SIGNIFICANT CHANGE UP (ref 0–0.5)
EOSINOPHIL NFR BLD AUTO: 0.2 % — SIGNIFICANT CHANGE UP (ref 0–6)
HCT VFR BLD CALC: 35.4 % — SIGNIFICANT CHANGE UP (ref 34.5–45)
HGB BLD-MCNC: 11.6 G/DL — SIGNIFICANT CHANGE UP (ref 11.5–15.5)
IMM GRANULOCYTES NFR BLD AUTO: 0.9 % — SIGNIFICANT CHANGE UP (ref 0–0.9)
LYMPHOCYTES # BLD AUTO: 0.92 K/UL — LOW (ref 1–3.3)
LYMPHOCYTES # BLD AUTO: 9.6 % — LOW (ref 13–44)
MCHC RBC-ENTMCNC: 30 PG — SIGNIFICANT CHANGE UP (ref 27–34)
MCHC RBC-ENTMCNC: 32.8 GM/DL — SIGNIFICANT CHANGE UP (ref 32–36)
MCV RBC AUTO: 91.5 FL — SIGNIFICANT CHANGE UP (ref 80–100)
MONOCYTES # BLD AUTO: 0.94 K/UL — HIGH (ref 0–0.9)
MONOCYTES NFR BLD AUTO: 9.8 % — SIGNIFICANT CHANGE UP (ref 2–14)
NEUTROPHILS # BLD AUTO: 7.56 K/UL — HIGH (ref 1.8–7.4)
NEUTROPHILS NFR BLD AUTO: 79.3 % — HIGH (ref 43–77)
PLATELET # BLD AUTO: 216 K/UL — SIGNIFICANT CHANGE UP (ref 150–400)
RBC # BLD: 3.87 M/UL — SIGNIFICANT CHANGE UP (ref 3.8–5.2)
RBC # FLD: 14.1 % — SIGNIFICANT CHANGE UP (ref 10.3–14.5)
WBC # BLD: 9.55 K/UL — SIGNIFICANT CHANGE UP (ref 3.8–10.5)
WBC # FLD AUTO: 9.55 K/UL — SIGNIFICANT CHANGE UP (ref 3.8–10.5)

## 2024-05-25 RX ORDER — AMPICILLIN TRIHYDRATE 250 MG
2 CAPSULE ORAL ONCE
Refills: 0 | Status: COMPLETED | OUTPATIENT
Start: 2024-05-25 | End: 2024-05-25

## 2024-05-25 RX ORDER — OXYTOCIN 10 UNIT/ML
VIAL (ML) INJECTION
Qty: 30 | Refills: 0 | Status: DISCONTINUED | OUTPATIENT
Start: 2024-05-25 | End: 2024-05-26

## 2024-05-25 RX ADMIN — Medication 2 MILLIUNIT(S)/MIN: at 16:11

## 2024-05-25 RX ADMIN — Medication 200 GRAM(S): at 16:03

## 2024-05-25 RX ADMIN — Medication 108 GRAM(S): at 19:52

## 2024-05-25 NOTE — OB PROVIDER H&P - NSHPPHYSICALEXAM_GEN_ALL_CORE
T(C): 36.6 (05-25-24 @ 15:11), Max: 36.6 (05-25-24 @ 14:43)  HR: 72 (05-25-24 @ 15:11) (72 - 72)  BP: 118/68 (05-25-24 @ 15:11) (118/68 - 118/68)  RR: 14 (05-25-24 @ 15:11) (14 - 14)  SpO2: --    Gen: NAD, well-appearing, AAOx3   Abd: Soft, gravid  Ext: non-tender, non-edematous  SSE: deferred  SVE:  1.5/50/-3 soft  Bedside sono: vertex  FHT: baseline 145, moderate variability, +accels, -decels   [place]: ctx q 10min

## 2024-05-25 NOTE — OB RN PATIENT PROFILE - NSSDOHLIVINGSIT_OBGYN_A_OB
Leave dressing for 5 days. Avoid strenuous activity for 1 week. Shower with the right foot covered. I have a steady place to live

## 2024-05-25 NOTE — OB PROVIDER H&P - NS_EDDCALCULATED_OBGYN_ALL_OB
Patient:   DEVON VELASCO            MRN: [id number]            FIN: [id number]              Age:   67 years     Sex:  FEMALE     :  47   Associated Diagnoses:   None   Author:   BRUNA NARAYAN     Basic Information   Source of history:  Self.    Referral source     Chief Complaint   Altered mental status     History of Present Illness   67-year-old female with a past medical history significant for COPD, lung CA her last chemo was 6 months ago, who has history of colon CA, Hypertension and arthritis, followed up with her oncologist for post chemo imaging.  Now for the past 10 days her  noticed that she becomes confused she does not know where she is in the house.. Denies any headaches nausea or vomiting fever.  her  brought to the patient to the emergency room.   No chest pain or shortness of breath     Review of Systems   Constitutional:  Negative.    Eye:  Negative.    Ear/Nose/Mouth/Throat:  Negative.    Respiratory:  Negative.    Cardiovascular:  Negative.    Gastrointestinal:  Negative.    Genitourinary:  Negative.    Hematology/Lymphatics:  Negative.    Endocrine:  Negative.    Immunologic:  Negative.    Musculoskeletal:  Negative.    Integumentary:  Negative.    Neurologic:  Confusion.    Psychiatric:  Negative.    All other systems are negative     Health Status   Allergies:    Allergic Reactions (All)  NKA  Canceled/Inactive Reactions (All)  Severity Not Documented  NKA,- No reactions were documented.   Current medications:    Medications (14) Active  Scheduled: (6)  Albuterol-ipratropium 2.5-0.5 mg/3 mL nebulizer soln  3 mL, Nebulizer, Q6H  AmLODIPine 10 mg tab  10 mg 1 tab, Oral, Daily  Heparin 5,000 unit/1 mL inj  5,000 unit 1 mL, Subcutaneous, Q8H  Lisinopril 10 mg tab  10 mg 1 tab, Oral, Daily  Meloxicam 15 mg tab  15 mg 1 tab, Oral, Daily  Multivitamin tab  1 tab, Oral, Daily  Continuous: (0)  PRN: (8)  Acetaminophen 325 mg tab  650 mg 2 tab, Oral,  Q6H  Albuterol-ipratropium 2.5-0.5 mg/3 mL nebulizer soln  3 mL, Nebulizer, Q6H  Docusate sodium 100 mg cap  100 mg 1 cap, Oral, BID  HYDROcodone-acetaminophen 5-325 mg tab  1 tab, Oral, Q6H  MetoCLOPramide 10 mg/2 mL inj SDV  10 mg 2 mL, Slow IV Push, Q6H  Ondansetron 4 mg/2 mL inj SDV  4 mg 2 mL, Slow IV Push, Q8H  Senna-docusate sodium 8.6-50 mg tab  1 tab, Oral, Q Bedtime  Zolpidem 5 mg tab  5 mg 1 tab, Oral, Q Bedtime      Histories   Past Medical History: Problem List / Past Medical History   Asthma  COPD (chronic obstructive pulmonary disease)  Colon cancer  High blood pressure  Lung cancer    Family History:    Prostate Cancer  GRANDFATHER  Lung cancer  MOTHER    Procedure history:    Lobectomy of lung ([id number]).  Colon operation ([id number]).  Insertion of Port-a-cath ([id number]).   Social History       Alcohol  Details: Use: Current.  Frequency: 1-2 times per month.; Comment(s): occasional wine  Exercise  Details: Excercise: Never.; Comment(s): works as a manager of nutrition at the [employer]  Home/Environment  Details: Alcohol Abuse in Household: No.  Substance Abuse in Household: No.  Smoker in Household: No.  Patient Lives With: Spouse.  Living Situation: Lives With Spouse.  Ambulation: Independent.  Bathing: Independent.  Dressing: Independent.  Driving: Independent.  Eating: Independent.  Elimination: Independent.  Grooming: Independent.  Preparing Meal: Independent.  Taking Meds: Independent.  Toileting: Independent.  Transfers: Independent.   Assistive Devices Home: Glasses.  Substance Abuse  Details: Use: None.  Tobacco  Details: Used in Last 12 Months: No.  Use: Former smoker.  Type: Cigarettes.  Cultural/Protestant Practices  Details: Protestant or Cultural Practices While in Hospital: No.  .       Physical Examination   VS/Measurements       Vitals between:   05-JAN-2019 13:31:28   TO   06-JAN-2019 13:31:28                   LAST RESULT MINIMUM MAXIMUM  Temperature 37.1 37.1 37.3  Heart  Rate 82 76 89  Respiratory Rate 16 16 18  NISBP           132 112 156  NIDBP           75 66 100  NIMBP           91 91 119  SpO2                    96 96 98    General:  Alert and oriented, No acute distress.    Eye:  Pupils are equal, round and reactive to light, Extraocular movements are intact, Normal conjunctiva.   HENT:  Normocephalic, Normal hearing, Oral mucosa is moist, No pharyngeal erythema.   Neck:  Supple, Non-tender, No carotid bruit, No jugular venous distention, No lymphadenopathy, No thyromegaly.   Respiratory:  Lungs are clear to auscultation, Respirations are non-labored, Breath sounds are equal, Symmetrical chest wall expansion, No chest wall tenderness.   Cardiovascular:  Normal rate, Regular rhythm, No murmur, No gallop, Good pulses equal in all extremities, Normal peripheral perfusion, No edema.   Gastrointestinal:  Soft, Non-tender, Non-distended, Normal bowel sounds, No organomegaly.   Genitourinary:  No costovertebral angle tenderness, No inguinal tenderness, No urethral discharge, No lesions.   Lymphatics:  No lymphadenopathy neck, axilla, groin.    Musculoskeletal     Normal range of motion.     Normal strength.     No tenderness.     No swelling.     No deformity.     Normal gait.     Integumentary:  Warm, Pink, Moist, No pallor, No rash.    Neurologic:  Alert, Oriented, Normal sensory, Normal motor function, No focal deficits.   Cognition and Speech:  Oriented, Speech clear and coherent, Functional cognition intact.   Psychiatric:  Cooperative, Appropriate mood & affect, Normal judgment, Non-suicidal.      Health Maintenance   Immunizations:  Up to date per MMWR-CDC guidelines.      Review / Management   Results review:       Labs between:  05-JAN-2019 13:31 to 06-JAN-2019 13:31    CBC:                 WBC  HgB  Hct  Plt  MCV  RDW   06-JAN-2019 4.2  12.0  36.9  253  94.4  14.3     DIFF:                 Seg  Neutroph//ABS  Lymph//ABS  Mono//ABS  EOS/ABS  06-JAN-2019 NOT APPLICABLE  90 //  3.8 6 // (L) 0.2  4 // (L) 0.2  0 // (L) 0.0     BMP:                 Na  Cl  BUN  Glu   06-JAN-2019 142  107  15  (H) 162                              K  CO2  Cr  Ca                              4.1  25  0.81  9.6     CMP:                 AST  ALT  AlkPhos  Bili  Albumin   06-JAN-2019 12  18  100  0.5  (L) 3.4     POC GLU:                 Latest Result  Latest Date  Minimum  Min Date  Maximum  Max Date                             97 05-JAN-2019 97 05-JAN-2019 97                             .    Condition:  Stable.    EKG interpretation: RRR, no signs of acute ischemia, normal axis   CT head with mets to the brain and mild edema, no midline shift     Impression and Plan   67-year-old female with altered mental status    # AMS with brain mets. mild edema  -Received some Decadron in the emergency room  - will cont. with decadron. Ct shows 10 lesions.. nonrescetable case  - neurology is consulted.. may need  seizure px as well.  - Radiation oncology to see pt.    # Lung Ca  - post chemo... will request primary onc to fu with patient.    # HTN  - stable on amlodipine  and lisinopril cpc    #  COPD..   - stable . cont. pts  Neb rx    DVt px on sq hep     pcp LESLIE Araujo informed     Professional Services   I expect that this patient will require medically necessary hospital services for two midnights or more.   First Trimester Sonogram

## 2024-05-25 NOTE — OB PROVIDER H&P - NS_PRENATALLABSOURCEGBS1PN_OBGYN_ALL_OB
Fetal Movement   WHAT YOU NEED TO KNOW:   Fetal movements are the kicks, rolls, and hiccups of your unborn baby  You may start to feel these movements when you are 20 weeks pregnant  The movements grow stronger and more frequent as your baby grows  Fetal movements show that your unborn baby is getting the oxygen and nutrients he or she needs before birth  Fewer fetal movements may signal a problem with your baby's health  DISCHARGE INSTRUCTIONS:   Follow up with your doctor or obstetrician as directed:  Write down your questions so you remember to ask them during your visits  Normal fetal movement:  Fetal activity can be described by 4 states, from least to most active  During quiet sleep, your unborn baby may be still for up to 2 hours  During active sleep, he or she kicks, rolls, and moves often  During the quiet awake state, he or she may only move his or her eyes  The active awake state includes strong kicks and rolls  What affects fetal movement:  You may feel your baby move more after you eat, or after you drink caffeine  You may feel your baby move less while you are more active, such as when you exercise  You may also feel fewer movements if you are obese  Certain medicines can change your baby's movements  Tell your healthcare provider about the medicines you are taking  Track fetal movements at home:  Fetal movement is most often felt when you lie quietly on your side  Your healthcare provider may ask you to count movements for 2 hours  He or she may ask you to track how long it takes for your baby to move 10 times  Keep a log of your baby's movements  Contact your doctor or obstetrician if:   It takes longer than usual to feel 8 of your unborn baby's movements  You do not feel your unborn baby move at least 10 times in 2 hours  The skin on your hands, feet, and around your eyes is more swollen than usual     You have a headache for at least 24 hours      Tiny red dots appear on your skin     Your belly is tender when you press on it  You have questions or concerns about your condition or care  Return to the emergency department if:   You do not feel your unborn baby move for 12 hours  You feel cramping or constant pain in your abdomen  You have heavy bleeding from your vagina  You have a severe headache and cannot see clearly  You are having trouble breathing or are vomiting  You have a seizure  © Copyright Lorri Eldridge 2022 Information is for End User's use only and may not be sold, redistributed or otherwise used for commercial purposes  The above information is an  only  It is not intended as medical advice for individual conditions or treatments  Talk to your doctor, nurse or pharmacist before following any medical regimen to see if it is safe and effective for you  Early Labor Signs   WHAT YOU NEED TO KNOW:   Early labor signs can happen weeks, days, or hours before your baby is ready to be delivered  You may have false labor signs, which are also called [place] contractions  False labor is common and may happen several weeks or days before your actual labor  The contractions are not regular, and do not get closer together  The pain is usually mild, does not worsen, and is felt only in front  [place] contractions may happen later in the day, and stop after you change position, walk, or rest   DISCHARGE INSTRUCTIONS:   Call 911 for any of the following: You have heavy vaginal bleeding  You cannot get to the hospital before the baby starts to come out  Return to the emergency department if:   You have regular, painful contractions that are less than 5 minutes apart and last 30 to 70 seconds each  You have a constant trickle or sudden gush of clear fluid from your vagina  You notice a sudden decrease in your baby's movement      Contact your obstetrician or healthcare provider if:   You have pain in your lower back or abdomen that does not get better when you change positions  You have bloody mucus or show  You have questions or concerns about your condition or care  Early Labor signs and symptoms:   Lightening  occurs when your baby drops inside your pelvis  You may feel increased pressure in your pelvis  This may happen a few weeks to a few hours before your labor begins  Contractions  are cramps and tightening that occur in your uterus to help move the baby through your birth canal  Contractions occur regularly and more often each time  Each one lasts about 30 to 70 seconds, and gets stronger until you deliver your baby  Contractions do not go away with movement  The pain usually starts in your lower back and moves to your abdomen  Effacement  occurs when your cervix softens and thins, so it can easily open for the baby  You will not be able to feel effacement  Your healthcare provider will examine your cervix for effacement  Dilation  is widening of your cervix  Your healthcare provider will examine your cervix for dilation  Your cervix may start to dilate weeks before your baby is delivered  Your cervix will be fully opened and ready for delivery when it is dilated to 10 centimeters  Increased discharge  from your vagina may occur  It may be brown, pink, clear, or slightly bloody  This discharge may also be called bloody show  Bloody show is a mucus plug that forms and blocks your cervix during pregnancy  The discharge may mean that your cervix is opening up and getting ready for delivery  Rupture of membranes  is a sudden release of clear fluid from your vagina  Ruptured membranes means your water broke  Your healthcare provider may need to break your water if it does not happen on its own  © Copyright Yani Rivera 2022 Information is for End User's use only and may not be sold, redistributed or otherwise used for commercial purposes  The above information is an  only   It is not intended as medical advice for individual conditions or treatments  Talk to your doctor, nurse or pharmacist before following any medical regimen to see if it is safe and effective for you  unknown

## 2024-05-25 NOTE — OB RN PATIENT PROFILE - FALL HARM RISK - UNIVERSAL INTERVENTIONS
Bed in lowest position, wheels locked, appropriate side rails in place/Call bell, personal items and telephone in reach/Instruct patient to call for assistance before getting out of bed or chair/Non-slip footwear when patient is out of bed/[place] to call system/Physically safe environment - no spills, clutter or unnecessary equipment/Purposeful Proactive Rounding/Room/bathroom lighting operational, light cord in reach

## 2024-05-25 NOTE — OB RN PATIENT PROFILE - SUPPORT PERSON NAME
I will STOP taking the medications listed below when I get home from the hospital:  None
Louis Christophereo

## 2024-05-25 NOTE — OB PROVIDER H&P - HISTORY OF PRESENT ILLNESS
Patient is a 33 year old  at 40w3d by US who presents to L&D for IOL at term     DEANNE:  2024   LMP: 2023      Pregnancy course:   AMA   Marginal cord insertion      Obhx:  2018 FT;  11/3/2015 FT; TOP x2 (D&C x1, medical term x1)  Gynhx: denies fibroids, cysts, STIs, abnl paps   Pmhx: denies   Pshx: denies   Meds: PNV   Allergies: NKDA   Social Hx: denies tobacco, recreational drug, alcohol use during pregnancy. Feels safe at home     Ultrasound: cephalic posterior   EFW: 3913g . EFW now 4500

## 2024-05-26 ENCOUNTER — NON-APPOINTMENT (OUTPATIENT)
Age: 34
End: 2024-05-26

## 2024-05-26 ENCOUNTER — RESULT REVIEW (OUTPATIENT)
Age: 34
End: 2024-05-26

## 2024-05-26 ENCOUNTER — TRANSCRIPTION ENCOUNTER (OUTPATIENT)
Age: 34
End: 2024-05-26

## 2024-05-26 LAB — T PALLIDUM AB TITR SER: NEGATIVE — SIGNIFICANT CHANGE UP

## 2024-05-26 PROCEDURE — 59400 OBSTETRICAL CARE: CPT

## 2024-05-26 PROCEDURE — 88307 TISSUE EXAM BY PATHOLOGIST: CPT | Mod: 26

## 2024-05-26 RX ORDER — DIPHENHYDRAMINE HCL 50 MG
25 CAPSULE ORAL EVERY 6 HOURS
Refills: 0 | Status: DISCONTINUED | OUTPATIENT
Start: 2024-05-26 | End: 2024-05-28

## 2024-05-26 RX ORDER — HYDROCORTISONE 1 %
1 OINTMENT (GRAM) TOPICAL EVERY 6 HOURS
Refills: 0 | Status: DISCONTINUED | OUTPATIENT
Start: 2024-05-26 | End: 2024-05-28

## 2024-05-26 RX ORDER — BENZOCAINE 10 %
1 GEL (GRAM) MUCOUS MEMBRANE EVERY 6 HOURS
Refills: 0 | Status: DISCONTINUED | OUTPATIENT
Start: 2024-05-26 | End: 2024-05-28

## 2024-05-26 RX ORDER — OXYCODONE HYDROCHLORIDE 5 MG/1
5 TABLET ORAL
Refills: 0 | Status: DISCONTINUED | OUTPATIENT
Start: 2024-05-26 | End: 2024-05-28

## 2024-05-26 RX ORDER — DIBUCAINE 1 %
1 OINTMENT (GRAM) RECTAL EVERY 6 HOURS
Refills: 0 | Status: DISCONTINUED | OUTPATIENT
Start: 2024-05-26 | End: 2024-05-28

## 2024-05-26 RX ORDER — OXYCODONE HYDROCHLORIDE 5 MG/1
5 TABLET ORAL ONCE
Refills: 0 | Status: DISCONTINUED | OUTPATIENT
Start: 2024-05-26 | End: 2024-05-28

## 2024-05-26 RX ORDER — LANOLIN
1 OINTMENT (GRAM) TOPICAL EVERY 6 HOURS
Refills: 0 | Status: DISCONTINUED | OUTPATIENT
Start: 2024-05-26 | End: 2024-05-28

## 2024-05-26 RX ORDER — OXYTOCIN 10 UNIT/ML
41.67 VIAL (ML) INJECTION
Qty: 20 | Refills: 0 | Status: DISCONTINUED | OUTPATIENT
Start: 2024-05-26 | End: 2024-05-28

## 2024-05-26 RX ORDER — AER TRAVELER 0.5 G/1
1 SOLUTION RECTAL; TOPICAL EVERY 4 HOURS
Refills: 0 | Status: DISCONTINUED | OUTPATIENT
Start: 2024-05-26 | End: 2024-05-28

## 2024-05-26 RX ORDER — IBUPROFEN 200 MG
1 TABLET ORAL
Qty: 28 | Refills: 0
Start: 2024-05-26 | End: 2024-06-01

## 2024-05-26 RX ORDER — PRAMOXINE HYDROCHLORIDE 150 MG/15G
1 AEROSOL, FOAM RECTAL EVERY 4 HOURS
Refills: 0 | Status: DISCONTINUED | OUTPATIENT
Start: 2024-05-26 | End: 2024-05-28

## 2024-05-26 RX ORDER — IBUPROFEN 200 MG
600 TABLET ORAL EVERY 6 HOURS
Refills: 0 | Status: DISCONTINUED | OUTPATIENT
Start: 2024-05-26 | End: 2024-05-28

## 2024-05-26 RX ORDER — MAGNESIUM HYDROXIDE 400 MG/1
30 TABLET, CHEWABLE ORAL
Refills: 0 | Status: DISCONTINUED | OUTPATIENT
Start: 2024-05-26 | End: 2024-05-28

## 2024-05-26 RX ORDER — SODIUM CHLORIDE 9 MG/ML
3 INJECTION INTRAMUSCULAR; INTRAVENOUS; SUBCUTANEOUS EVERY 8 HOURS
Refills: 0 | Status: DISCONTINUED | OUTPATIENT
Start: 2024-05-26 | End: 2024-05-28

## 2024-05-26 RX ORDER — TETANUS TOXOID, REDUCED DIPHTHERIA TOXOID AND ACELLULAR PERTUSSIS VACCINE, ADSORBED 5; 2.5; 8; 8; 2.5 [IU]/.5ML; [IU]/.5ML; UG/.5ML; UG/.5ML; UG/.5ML
0.5 SUSPENSION INTRAMUSCULAR ONCE
Refills: 0 | Status: DISCONTINUED | OUTPATIENT
Start: 2024-05-26 | End: 2024-05-28

## 2024-05-26 RX ORDER — ACETAMINOPHEN 500 MG
3 TABLET ORAL
Qty: 84 | Refills: 0
Start: 2024-05-26 | End: 2024-06-01

## 2024-05-26 RX ORDER — KETOROLAC TROMETHAMINE 30 MG/ML
30 SYRINGE (ML) INJECTION ONCE
Refills: 0 | Status: DISCONTINUED | OUTPATIENT
Start: 2024-05-26 | End: 2024-05-26

## 2024-05-26 RX ORDER — ACETAMINOPHEN 500 MG
975 TABLET ORAL
Refills: 0 | Status: DISCONTINUED | OUTPATIENT
Start: 2024-05-26 | End: 2024-05-28

## 2024-05-26 RX ORDER — IBUPROFEN 200 MG
600 TABLET ORAL EVERY 6 HOURS
Refills: 0 | Status: COMPLETED | OUTPATIENT
Start: 2024-05-26 | End: 2025-04-24

## 2024-05-26 RX ORDER — SIMETHICONE 80 MG/1
80 TABLET, CHEWABLE ORAL EVERY 4 HOURS
Refills: 0 | Status: DISCONTINUED | OUTPATIENT
Start: 2024-05-26 | End: 2024-05-28

## 2024-05-26 RX ADMIN — Medication 108 GRAM(S): at 04:06

## 2024-05-26 RX ADMIN — Medication 108 GRAM(S): at 08:03

## 2024-05-26 RX ADMIN — Medication 30 MILLIGRAM(S): at 13:39

## 2024-05-26 RX ADMIN — SODIUM CHLORIDE 3 MILLILITER(S): 9 INJECTION INTRAMUSCULAR; INTRAVENOUS; SUBCUTANEOUS at 22:00

## 2024-05-26 RX ADMIN — Medication 1000 MILLIUNIT(S)/MIN: at 13:41

## 2024-05-26 RX ADMIN — Medication 108 GRAM(S): at 12:19

## 2024-05-26 RX ADMIN — SODIUM CHLORIDE 125 MILLILITER(S): 9 INJECTION, SOLUTION INTRAVENOUS at 00:00

## 2024-05-26 RX ADMIN — SODIUM CHLORIDE 125 MILLILITER(S): 9 INJECTION, SOLUTION INTRAVENOUS at 03:20

## 2024-05-26 RX ADMIN — Medication 108 GRAM(S): at 00:00

## 2024-05-26 RX ADMIN — SODIUM CHLORIDE 125 MILLILITER(S): 9 INJECTION, SOLUTION INTRAVENOUS at 06:58

## 2024-05-26 RX ADMIN — Medication 600 MILLIGRAM(S): at 23:11

## 2024-05-26 RX ADMIN — Medication 975 MILLIGRAM(S): at 20:53

## 2024-05-26 NOTE — OB PROVIDER LABOR PROGRESS NOTE - NS_OBIHIFHRDETAILS_OBGYN_ALL_OB_FT
baseline 135, mod variability, +accels, -decels
150 baseline, moderate variability, + accels, + early decelerations
baseline 150, moderate variability, +accels, -decels

## 2024-05-26 NOTE — DISCHARGE NOTE OB - SWOLLEN AREA ON THE LEG THAT IS PAINFUL, RED OR HOT
Assisted  with ultrasound guided thoracentesis. Consent signed and placed in chart accordingly. All questions answered at this time. ~500 cc removed from LEFT side. Drainage is complete. See MD note. Discharge instructions given to patient with complete understanding. Patient D/C home at this time in stable condition. Statement Selected

## 2024-05-26 NOTE — DISCHARGE NOTE OB - MEDICATION SUMMARY - MEDICATIONS TO TAKE
I will START or STAY ON the medications listed below when I get home from the hospital:    ibuprofen 600 mg oral tablet  -- 1 tab(s) by mouth every 6 hours as needed for , Moderate Pain  -- Indication: For Pain    acetaminophen 325 mg oral tablet  -- 3 tab(s) by mouth every 6 hours  -- Indication: For Pain    Pepcid 20 mg oral tablet  -- 1 tab(s) by mouth once a day   -- It is very important that you take or use this exactly as directed.  Do not skip doses or discontinue unless directed by your doctor.  Obtain medical advice before taking any non-prescription drugs as some may affect the action of this medication.    -- Indication: For reflux

## 2024-05-26 NOTE — OB RN DELIVERY SUMMARY - NSSELHIDDEN_OBGYN_ALL_OB_FT
[NS_DeliveryAttending1_OBGYN_ALL_OB_FT:[id number]=],[NS_DeliveryRN_OBGYN_ALL_OB_FT:[id number]=]

## 2024-05-26 NOTE — DISCHARGE NOTE OB - PLAN OF CARE
1) Please take ibuprofen and/or Tylenol as needed for pain as prescribed.  2) Nothing in the vagina for 6 weeks (including no sex, no tampons, and no douching).  3) Please call your doctor for a follow up your postpartum appointment in 4-6 weeks.  4) Please continue taking vitamins postpartum.   5) Please call the office sooner if you have heavy vaginal bleeding, severe abdominal pain, or fever > 100.4F.  6) You may resume regular daily activity as tolerated Delivery c/b shoulder dystocia, vaginal delivery otherwise successful.

## 2024-05-26 NOTE — OB PROVIDER DELIVERY SUMMARY - NSSELHIDDEN_OBGYN_ALL_OB_FT
[NS_DeliveryAttending1_OBGYN_ALL_OB_FT:[id number]=],[NS_DeliveryRN_OBGYN_ALL_OB_FT:[id number]=]

## 2024-05-26 NOTE — DISCHARGE NOTE OB - CARE PROVIDER_API CALL
Laura Chong  Obstetrics and Gynecology  [facility], [address] [address]  Phone: [phone]  Fax: [phone]  Established Patient  Follow Up Time: 2 weeks

## 2024-05-26 NOTE — DISCHARGE NOTE OB - HOSPITAL COURSE
Patient underwent a normal spontaneous vaginal delivery c/b shoulder dystocia. Post-partum course was uncomplicated. Pain is well controlled with PRN medication. She has no difficulty with ambulation, voiding, or PO intake. Lab values and vital signs are within normal limits prior to discharge.

## 2024-05-26 NOTE — OB PROVIDER DELIVERY SUMMARY - NS_DELIVERYNEONATOLOGIST1_OBGYN_ALL_OB_FT
Arterial Line Placement    Start time: 12/31/2020 8:15 AM  End time: 12/31/2020 8:17 AM  Performed by: Dara Dyson CRNA  Authorized by: Reji Biswas MD     Pre-Procedure  Indications:  Arterial pressure monitoring and blood sampling  Preanesthetic Checklist: patient identified, risks and benefits discussed, anesthesia consent, site marked, patient being monitored, timeout performed and patient being monitored    Timeout Time: 08:15        Procedure:   Prep:  ChloraPrep  Seldinger Technique?: Yes    Orientation:  Left  Location:  Radial artery  Catheter size:  20 G  Number of attempts:  1  Cont Cardiac Output Sensor: No      Assessment:   Post-procedure:  Line secured and sterile dressing applied  Patient Tolerance:  Patient tolerated the procedure well with no immediate complications Florentin ABRAHAM

## 2024-05-26 NOTE — DISCHARGE NOTE OB - CARE PLAN
1 Principal Discharge DX:	Normal spontaneous vaginal delivery  Assessment and plan of treatment:	1) Please take ibuprofen and/or Tylenol as needed for pain as prescribed.  2) Nothing in the vagina for 6 weeks (including no sex, no tampons, and no douching).  3) Please call your doctor for a follow up your postpartum appointment in 4-6 weeks.  4) Please continue taking vitamins postpartum.   5) Please call the office sooner if you have heavy vaginal bleeding, severe abdominal pain, or fever > 100.4F.  6) You may resume regular daily activity as tolerated  Secondary Diagnosis:	Shoulder dystocia, delivered  Assessment and plan of treatment:	Delivery c/b shoulder dystocia, vaginal delivery otherwise successful.

## 2024-05-26 NOTE — OB PROVIDER DELIVERY SUMMARY - NSPROVIDERDELIVERYNOTE_OBGYN_ALL_OB_FT
Vaginal Delivery Summary    Procedure: Normal spontaneous vaginal delivery   Findings: Viable female infant delivered in cephalic presentation at 1301, placenta delivered at 1312.  Apgar scores 9/9.   Weight 4140g  Laceration(s): 2nd degree perineal  Repair: Vicryl  EBL: 210  Complications: Shoulder dystocia    Procedure:   Patient felt rectal pressure and was found to be fully dilated, +2 station. She pushed effectively. Turtle sign was noted, and patient was placed in Mickie position. The woodscrew maneuver was attempted and the posterior arm was delivered. She then delivered a viable female infant. Delayed cord clamping was performed for 60 seconds. Placenta delivered intact and pitocin was started at the delivery of baby. Perineum and vagina were inspected, 2nd degree laceration(s) present and repaired. Adequate hemostasis was obtained. Fundus was noted to be firm.

## 2024-05-26 NOTE — OB PROVIDER LABOR PROGRESS NOTE - ASSESSMENT
Patient does not feel ready to push  Will labor down  Expect vaginal delivery  Will begin pushing when patient feels constant pressures
FHT cat I  Regular ctx  Cervical exam progressing  Continue pit and uptitrate per protocol
IOL at 40.3w GA  AROM clear at time of exam  on pit of 8, titrate as indicated per fht and contractions pattern  cat 1 fht    discussed with Dr. Kearney

## 2024-05-26 NOTE — OB NEONATOLOGY/PEDIATRICIAN DELIVERY SUMMARY - NSPEDSNEONOTESA_OBGYN_ALL_OB_FT
Baby Girl Terraro born at 40.4 via  to a 34yo  O+, Hep B neg, HIV NR, RPR NR, Rubella Imm, GBS positive mother. No significant maternal hx. Prenatal course c/w marginal cord insertion. AROM clear fluid 14 hours prior to delivery. Received multiple doses of ampicillin >4h prior to delivery. Highest maternal temp 37.7. Joshua called to delivery for shoulder dystocia. On arrival, baby had already delivered and was doing well. Moving both arms spontaneously, intact jen, no clavicular stepoff. Normal exam. RN to assign apgar scores. EOS 0.32. Admit to  nursery.

## 2024-05-26 NOTE — DISCHARGE NOTE OB - NS MD DC FALL RISK RISK
For information on Fall & Injury Prevention, visit: [email or web address] OR  [email or web address] OR  https://www.cdc.gov/steadi/patient.html

## 2024-05-26 NOTE — OB RN DELIVERY SUMMARY - NS_SEPSISRSKCALC_OBGYN_ALL_OB_FT
EOS calculated successfully. EOS Risk Factor: 0.5/1000 live births ([facility] national incidence); GA=40w4d; Temp=99.86; ROM=13.417; GBS='Positive'; Antibiotics='Broad spectrum antibiotics > 4 hrs prior to birth'

## 2024-05-26 NOTE — OB PROVIDER LABOR PROGRESS NOTE - NS_SUBJECTIVE/OBJECTIVE_OBGYN_ALL_OB_FT
Pt is uncomfortable, requesting top off
Patient in discomfort during contractions
Vital Signs Last 24 Hrs  T(C): 37.0 (25 May 2024 22:04), Max: 37.0 (25 May 2024 22:04)  T(F): 98.6 (25 May 2024 22:04), Max: 98.6 (25 May 2024 22:04)  HR: 85 (25 May 2024 22:21) (72 - 85)  BP: 108/61 (25 May 2024 22:21) (93/55 - 118/68)  RR: 14 (25 May 2024 22:04) (14 - 14)

## 2024-05-26 NOTE — DISCHARGE NOTE OB - PATIENT PORTAL LINK FT
You can access the FollowMyHealth Patient Portal offered by [facility] by registering at the following website: [email or web address]. By joining [employer]’s FollowMyHealth portal, you will also be able to view your health information using other applications (apps) compatible with our system.

## 2024-05-26 NOTE — OB PROVIDER DELIVERY SUMMARY - NSLOWPPHRISK_OBGYN_A_OB
No previous uterine incision/[place] Pregnancy/Less than or equal to 4 previous vaginal births/No known bleeding disorder/No history of postpartum hemorrhage/No other PPH risks indicated

## 2024-05-27 LAB
HCT VFR BLD CALC: 31.8 % — LOW (ref 34.5–45)
HGB BLD-MCNC: 10.3 G/DL — LOW (ref 11.5–15.5)

## 2024-05-27 RX ADMIN — Medication 600 MILLIGRAM(S): at 23:09

## 2024-05-27 RX ADMIN — SODIUM CHLORIDE 3 MILLILITER(S): 9 INJECTION INTRAMUSCULAR; INTRAVENOUS; SUBCUTANEOUS at 06:08

## 2024-05-27 RX ADMIN — Medication 600 MILLIGRAM(S): at 11:45

## 2024-05-27 RX ADMIN — Medication 1 TABLET(S): at 11:45

## 2024-05-27 RX ADMIN — Medication 975 MILLIGRAM(S): at 20:32

## 2024-05-27 RX ADMIN — Medication 600 MILLIGRAM(S): at 17:32

## 2024-05-27 RX ADMIN — Medication 975 MILLIGRAM(S): at 15:00

## 2024-05-27 RX ADMIN — Medication 975 MILLIGRAM(S): at 09:28

## 2024-05-27 RX ADMIN — Medication 975 MILLIGRAM(S): at 02:54

## 2024-05-27 RX ADMIN — Medication 600 MILLIGRAM(S): at 05:39

## 2024-05-27 NOTE — PROGRESS NOTE ADULT - ATTENDING COMMENTS
ppd#1 from [facility] [place] with shoulder dystocia, mother and baby doing well  nexplanon to be placed in the office  discharge later today if patient desires  Laura Chong

## 2024-05-28 VITALS
OXYGEN SATURATION: 99 % | SYSTOLIC BLOOD PRESSURE: 99 MMHG | DIASTOLIC BLOOD PRESSURE: 60 MMHG | RESPIRATION RATE: 18 BRPM | HEART RATE: 72 BPM | TEMPERATURE: 98 F

## 2024-05-28 PROCEDURE — 85014 HEMATOCRIT: CPT

## 2024-05-28 PROCEDURE — 36415 COLL VENOUS BLD VENIPUNCTURE: CPT

## 2024-05-28 PROCEDURE — 85025 COMPLETE CBC W/AUTO DIFF WBC: CPT

## 2024-05-28 PROCEDURE — 86901 BLOOD TYPING SEROLOGIC RH(D): CPT

## 2024-05-28 PROCEDURE — 86850 RBC ANTIBODY SCREEN: CPT

## 2024-05-28 PROCEDURE — 59050 FETAL MONITOR W/REPORT: CPT

## 2024-05-28 PROCEDURE — 90707 MMR VACCINE SC: CPT

## 2024-05-28 PROCEDURE — 85018 HEMOGLOBIN: CPT

## 2024-05-28 PROCEDURE — 88307 TISSUE EXAM BY PATHOLOGIST: CPT

## 2024-05-28 PROCEDURE — 86900 BLOOD TYPING SEROLOGIC ABO: CPT

## 2024-05-28 PROCEDURE — 86780 TREPONEMA PALLIDUM: CPT

## 2024-05-28 RX ORDER — POLYETHYLENE GLYCOL 3350 17 G/17G
17 POWDER, FOR SOLUTION ORAL ONCE
Refills: 0 | Status: COMPLETED | OUTPATIENT
Start: 2024-05-28 | End: 2024-05-28

## 2024-05-28 RX ADMIN — Medication 600 MILLIGRAM(S): at 05:37

## 2024-05-28 RX ADMIN — POLYETHYLENE GLYCOL 3350 17 GRAM(S): 17 POWDER, FOR SOLUTION ORAL at 08:41

## 2024-05-28 RX ADMIN — Medication 0.5 MILLILITER(S): at 10:51

## 2024-05-28 RX ADMIN — Medication 975 MILLIGRAM(S): at 08:42

## 2024-05-28 RX ADMIN — Medication 975 MILLIGRAM(S): at 02:09

## 2024-05-28 NOTE — PROGRESS NOTE ADULT - ASSESSMENT
INTERVAL HPI/OVERNIGHT EVENTS:  33y Female s/p labor epidural on 5/26/24    Vital Signs Last 24 Hrs  T(C): 36.9 (27 May 2024 04:13), Max: 37.2 (26 May 2024 15:34)  T(F): 98.5 (27 May 2024 04:13), Max: 99 (26 May 2024 15:34)  HR: 75 (27 May 2024 04:13) (60 - 104)  BP: 95/63 (27 May 2024 04:13) (92/49 - 109/62)  BP(mean): --  RR: 18 (27 May 2024 04:13) (18 - 18)  SpO2: 97% (27 May 2024 04:13) (95% - 97%)    Parameters below as of 26 May 2024 19:31  Patient On (Oxygen Delivery Method): room air            Patient's overall anesthesia satisfaction: Positive    Patient doing well     No headache      No residual numbness or weakness, sensory and motor function intact    No anesthetic complications or complaints noted or reported                 
ASSESSMENT:   LIZA GLORIA is a 33y  PPD1 s/p  at 40wk, c/b shoulder dystocia.   Patient has no other complaints at this time, is otherwise clinically and hemodynamically stable.    girl is with patient at bedside  Hgb: 11.6 > am lab    #Postpartum  - Continue routine post-partum care  - Pain management PRN  - Encourage maternal- bonding    - Diet: Regular, advance as tolerated  - DVT ppx: Ambulation encouraged  - Dispo: Home possibly today per attending's approval   
ASSESSMENT:   LIZA GLORIA is a 33y  PPD2 s/p  at 40wk, c/b shoulder dystocia.   Patient has no other complaints at this time, is otherwise clinically and hemodynamically stable.    girl is with patient at bedside  Hgb: 11.6 > 10.3    #Postpartum  - Continue routine post-partum care  - Pain management PRN  - Encourage maternal- bonding    - Diet: Regular, advance as tolerated  - DVT ppx: Ambulation encouraged  - Dispo: Home today per attending's approval

## 2024-05-28 NOTE — PROGRESS NOTE ADULT - SUBJECTIVE AND OBJECTIVE BOX
SUBJECTIVE:  Patient seen and examined at bedside this morning. No acute events overnight.    OBJECTIVE:  Vital Signs Last 24 Hrs  T(C): 36.9 (27 May 2024 15:26), Max: 36.9 (27 May 2024 15:26)  T(F): 98.5 (27 May 2024 15:26), Max: 98.5 (27 May 2024 15:26)  HR: 80 (27 May 2024 15:26) (80 - 80)  BP: 95/59 (27 May 2024 15:26) (95/59 - 95/59)    Physical exam:  General: AOx3, NAD.  Lungs: Breathing comfortably on RA  Abdomen: +BS, Soft, appropriately tender, nondistended, no guarding or rebound tenderness, firm uterine fundus at umbilicus  Ext: BL LE reveal minimal swelling, no popliteal tenderness or skin changes.       LABS:                          10.3   x     )-----------( x        ( 27 May 2024 05:30 )             31.8                 
SUBJECTIVE:  Patient seen and examined at bedside this morning. No acute events overnight. Reports feeling well this morning, pain is well controlled with PRN pain medication. Tolerating PO without N/V, voiding spontaneously with no complaints.   Denies fevers, chills, shortness of breath, chest pain.    OBJECTIVE:  Vital Signs Last 24 Hrs  T(C): 36.9 (27 May 2024 04:13), Max: 37.7 (26 May 2024 12:39)  T(F): 98.5 (27 May 2024 04:13), Max: 99.86 (26 May 2024 12:39)  HR: 75 (27 May 2024 04:13) (60 - 104)  BP: 95/63 (27 May 2024 04:13) (78/46 - 140/112)    Parameters below as of 26 May 2024 19:31  Patient On (Oxygen Delivery Method): room air      Physical exam:  General: AOx3, NAD.  Lungs: Breathing comfortably on RA  Abdomen: +BS, Soft, appropriately tender, nondistended, no guarding or rebound tenderness, firm uterine fundus at umbilicus  Ext: BL LE reveal minimal swelling, no popliteal tenderness or skin changes.     LABS:                        11.6   9.55  )-----------( 216      ( 25 May 2024 16:20 )             35.4       Antibody Screen: NEG (05-25-24 @ 16:00)

## 2024-05-31 ENCOUNTER — NON-APPOINTMENT (OUTPATIENT)
Age: 34
End: 2024-05-31

## 2024-06-05 ENCOUNTER — NON-APPOINTMENT (OUTPATIENT)
Age: 34
End: 2024-06-05

## 2024-06-05 ENCOUNTER — TRANSCRIPTION ENCOUNTER (OUTPATIENT)
Age: 34
End: 2024-06-05

## 2024-06-05 LAB — SURGICAL PATHOLOGY STUDY: SIGNIFICANT CHANGE UP

## 2024-06-06 ENCOUNTER — OUTPATIENT (OUTPATIENT)
Dept: OUTPATIENT SERVICES | Facility: HOSPITAL | Age: 34
LOS: 1 days | End: 2024-06-06
Payer: COMMERCIAL

## 2024-06-06 ENCOUNTER — APPOINTMENT (OUTPATIENT)
Dept: ULTRASOUND IMAGING | Facility: CLINIC | Age: 34
End: 2024-06-06
Payer: COMMERCIAL

## 2024-06-06 ENCOUNTER — TRANSCRIPTION ENCOUNTER (OUTPATIENT)
Age: 34
End: 2024-06-06

## 2024-06-06 ENCOUNTER — NON-APPOINTMENT (OUTPATIENT)
Age: 34
End: 2024-06-06

## 2024-06-06 DIAGNOSIS — R20.0 ANESTHESIA OF SKIN: ICD-10-CM

## 2024-06-06 PROCEDURE — 93971 EXTREMITY STUDY: CPT

## 2024-06-06 PROCEDURE — 93971 EXTREMITY STUDY: CPT | Mod: 26

## 2024-06-07 ENCOUNTER — APPOINTMENT (OUTPATIENT)
Dept: OBGYN | Facility: CLINIC | Age: 34
End: 2024-06-07
Payer: COMMERCIAL

## 2024-06-07 VITALS
DIASTOLIC BLOOD PRESSURE: 66 MMHG | BODY MASS INDEX: 32.78 KG/M2 | WEIGHT: 185 LBS | HEIGHT: 63 IN | SYSTOLIC BLOOD PRESSURE: 114 MMHG

## 2024-06-07 PROCEDURE — 0503F POSTPARTUM CARE VISIT: CPT

## 2024-06-07 NOTE — HISTORY OF PRESENT ILLNESS
[Postpartum Follow Up] : postpartum follow up [Last Pap Date: ___] : Last Pap Date: [unfilled] [Delivery Date: ___] : on [unfilled] [] : delivered by vaginal delivery [Female] : Delivery History: baby girl [Wt. ___] : weighing [unfilled] [Breastfeeding] : currently nursing [Abdominal Pain] : no abdominal pain [Breast Pain] : no breast pain [BreastFeeding Problems] : breastfeeding problems [S/Sx PP Depression] : no signs/symptoms of postpartum depression [Heavy Bleeding] : no heavy bleeding [Irregular Bleeding] : no irregular bleeding [Leg Pain] : leg pain [Vaginal Discharge] : no vaginal discharge [Back to Normal] : is back to normal in size [None] : no vaginal bleeding [Not Done] : Examination of breasts not done [Doing Well] : is doing well [No Sign of Infection] : is showing no signs of infection [Excellent Pain Control] : has excellent pain control [de-identified] : experiencing leg numbness, s/p Dopplers yesterday which returned normal, symptoms consistent with nerve irritation [de-identified] : nontender LE to palpation, no swelling, unevenness, or redness  [de-identified] : RTO 4w for final PP visit, discussed contraception, considering NuvaRing vs Mirena vs Nexplanon; EPDS NEG

## 2024-06-27 ENCOUNTER — NON-APPOINTMENT (OUTPATIENT)
Age: 34
End: 2024-06-27

## 2024-06-27 ENCOUNTER — TRANSCRIPTION ENCOUNTER (OUTPATIENT)
Age: 34
End: 2024-06-27

## 2024-07-05 ENCOUNTER — APPOINTMENT (OUTPATIENT)
Dept: OBGYN | Facility: CLINIC | Age: 34
End: 2024-07-05
Payer: COMMERCIAL

## 2024-07-05 VITALS
BODY MASS INDEX: 33.73 KG/M2 | WEIGHT: 190.4 LBS | DIASTOLIC BLOOD PRESSURE: 70 MMHG | HEIGHT: 63 IN | SYSTOLIC BLOOD PRESSURE: 124 MMHG

## 2024-07-05 PROCEDURE — 0503F POSTPARTUM CARE VISIT: CPT

## 2024-07-08 ENCOUNTER — TRANSCRIPTION ENCOUNTER (OUTPATIENT)
Age: 34
End: 2024-07-08

## 2024-07-16 ENCOUNTER — ASOB RESULT (OUTPATIENT)
Age: 34
End: 2024-07-16

## 2024-07-16 ENCOUNTER — APPOINTMENT (OUTPATIENT)
Dept: OBGYN | Facility: CLINIC | Age: 34
End: 2024-07-16

## 2024-07-16 ENCOUNTER — APPOINTMENT (OUTPATIENT)
Dept: ANTEPARTUM | Facility: CLINIC | Age: 34
End: 2024-07-16
Payer: COMMERCIAL

## 2024-07-16 VITALS
WEIGHT: 186 LBS | HEIGHT: 63 IN | DIASTOLIC BLOOD PRESSURE: 70 MMHG | SYSTOLIC BLOOD PRESSURE: 114 MMHG | BODY MASS INDEX: 32.96 KG/M2

## 2024-07-16 DIAGNOSIS — Z01.419 ENCOUNTER FOR GYNECOLOGICAL EXAMINATION (GENERAL) (ROUTINE) W/OUT ABNORMAL FINDINGS: ICD-10-CM

## 2024-07-16 DIAGNOSIS — Z30.430 ENCOUNTER FOR INSERTION OF INTRAUTERINE CONTRACEPTIVE DEVICE: ICD-10-CM

## 2024-07-16 LAB
HCG UR QL: NEGATIVE
QUALITY CONTROL: YES

## 2024-07-16 PROCEDURE — 81025 URINE PREGNANCY TEST: CPT

## 2024-07-16 PROCEDURE — 76856 US EXAM PELVIC COMPLETE: CPT | Mod: 59

## 2024-07-16 PROCEDURE — 99395 PREV VISIT EST AGE 18-39: CPT

## 2024-07-16 PROCEDURE — 58300 INSERT INTRAUTERINE DEVICE: CPT

## 2024-07-16 PROCEDURE — 76830 TRANSVAGINAL US NON-OB: CPT

## 2024-07-17 LAB
C TRACH RRNA SPEC QL NAA+PROBE: NOT DETECTED
HPV HIGH+LOW RISK DNA PNL CVX: NOT DETECTED
N GONORRHOEA RRNA SPEC QL NAA+PROBE: NOT DETECTED
SOURCE TP AMPLIFICATION: NORMAL

## 2024-07-22 LAB — CYTOLOGY CVX/VAG DOC THIN PREP: NORMAL

## 2025-02-11 ENCOUNTER — EMERGENCY (EMERGENCY)
Facility: HOSPITAL | Age: 35
LOS: 1 days | Discharge: HOME | End: 2025-02-11
Attending: STUDENT IN AN ORGANIZED HEALTH CARE EDUCATION/TRAINING PROGRAM
Payer: COMMERCIAL

## 2025-02-11 VITALS
RESPIRATION RATE: 18 BRPM | DIASTOLIC BLOOD PRESSURE: 79 MMHG | OXYGEN SATURATION: 100 % | HEIGHT: 62 IN | HEART RATE: 60 BPM | WEIGHT: 192.02 LBS | SYSTOLIC BLOOD PRESSURE: 124 MMHG | TEMPERATURE: 98 F

## 2025-02-11 LAB
ALBUMIN SERPL ELPH-MCNC: 4.3 G/DL — SIGNIFICANT CHANGE UP (ref 3.3–5.2)
ALP SERPL-CCNC: 74 U/L — SIGNIFICANT CHANGE UP (ref 40–120)
ALT FLD-CCNC: 13 U/L — SIGNIFICANT CHANGE UP
ANION GAP SERPL CALC-SCNC: 14 MMOL/L — SIGNIFICANT CHANGE UP (ref 5–17)
APTT BLD: 39 SEC — HIGH (ref 24.5–35.6)
AST SERPL-CCNC: 18 U/L — SIGNIFICANT CHANGE UP
BASOPHILS # BLD AUTO: 0.02 K/UL — SIGNIFICANT CHANGE UP (ref 0–0.2)
BASOPHILS NFR BLD AUTO: 0.2 % — SIGNIFICANT CHANGE UP (ref 0–2)
BILIRUB SERPL-MCNC: 0.5 MG/DL — SIGNIFICANT CHANGE UP (ref 0.4–2)
BUN SERPL-MCNC: 11.5 MG/DL — SIGNIFICANT CHANGE UP (ref 8–20)
CALCIUM SERPL-MCNC: 9.1 MG/DL — SIGNIFICANT CHANGE UP (ref 8.4–10.5)
CHLORIDE SERPL-SCNC: 102 MMOL/L — SIGNIFICANT CHANGE UP (ref 96–108)
CO2 SERPL-SCNC: 23 MMOL/L — SIGNIFICANT CHANGE UP (ref 22–29)
CREAT SERPL-MCNC: 0.83 MG/DL — SIGNIFICANT CHANGE UP (ref 0.5–1.3)
EGFR: 94 ML/MIN/1.73M2 — SIGNIFICANT CHANGE UP
EOSINOPHIL # BLD AUTO: 0.08 K/UL — SIGNIFICANT CHANGE UP (ref 0–0.5)
EOSINOPHIL NFR BLD AUTO: 0.9 % — SIGNIFICANT CHANGE UP (ref 0–6)
GLUCOSE SERPL-MCNC: 85 MG/DL — SIGNIFICANT CHANGE UP (ref 70–99)
HCG SERPL-ACNC: <4 MIU/ML — SIGNIFICANT CHANGE UP
HCT VFR BLD CALC: 40.7 % — SIGNIFICANT CHANGE UP (ref 34.5–45)
HGB BLD-MCNC: 13.4 G/DL — SIGNIFICANT CHANGE UP (ref 11.5–15.5)
IMM GRANULOCYTES # BLD AUTO: 0.03 K/UL — SIGNIFICANT CHANGE UP (ref 0–0.07)
IMM GRANULOCYTES NFR BLD AUTO: 0.4 % — SIGNIFICANT CHANGE UP (ref 0–0.9)
INR BLD: 0.97 RATIO — SIGNIFICANT CHANGE UP (ref 0.85–1.16)
LYMPHOCYTES # BLD AUTO: 2.1 K/UL — SIGNIFICANT CHANGE UP (ref 1–3.3)
LYMPHOCYTES NFR BLD AUTO: 24.8 % — SIGNIFICANT CHANGE UP (ref 13–44)
MCHC RBC-ENTMCNC: 30.2 PG — SIGNIFICANT CHANGE UP (ref 27–34)
MCHC RBC-ENTMCNC: 32.9 G/DL — SIGNIFICANT CHANGE UP (ref 32–36)
MCV RBC AUTO: 91.9 FL — SIGNIFICANT CHANGE UP (ref 80–100)
MONOCYTES # BLD AUTO: 0.69 K/UL — SIGNIFICANT CHANGE UP (ref 0–0.9)
MONOCYTES NFR BLD AUTO: 8.1 % — SIGNIFICANT CHANGE UP (ref 2–14)
NEUTROPHILS # BLD AUTO: 5.55 K/UL — SIGNIFICANT CHANGE UP (ref 1.8–7.4)
NEUTROPHILS NFR BLD AUTO: 65.6 % — SIGNIFICANT CHANGE UP (ref 43–77)
NRBC # BLD AUTO: 0 K/UL — SIGNIFICANT CHANGE UP (ref 0–0)
NRBC # FLD: 0 K/UL — SIGNIFICANT CHANGE UP (ref 0–0)
NRBC BLD AUTO-RTO: 0 /100 WBCS — SIGNIFICANT CHANGE UP (ref 0–0)
PLATELET # BLD AUTO: 255 K/UL — SIGNIFICANT CHANGE UP (ref 150–400)
PMV BLD: 10.6 FL — SIGNIFICANT CHANGE UP (ref 7–13)
POTASSIUM SERPL-MCNC: 3.8 MMOL/L — SIGNIFICANT CHANGE UP (ref 3.5–5.3)
POTASSIUM SERPL-SCNC: 3.8 MMOL/L — SIGNIFICANT CHANGE UP (ref 3.5–5.3)
PROT SERPL-MCNC: 7.1 G/DL — SIGNIFICANT CHANGE UP (ref 6.6–8.7)
PROTHROM AB SERPL-ACNC: 11.3 SEC — SIGNIFICANT CHANGE UP (ref 9.9–13.4)
RBC # BLD: 4.43 M/UL — SIGNIFICANT CHANGE UP (ref 3.8–5.2)
RBC # FLD: 12.1 % — SIGNIFICANT CHANGE UP (ref 10.3–14.5)
SODIUM SERPL-SCNC: 139 MMOL/L — SIGNIFICANT CHANGE UP (ref 135–145)
TROPONIN T, HIGH SENSITIVITY RESULT: <6 NG/L — SIGNIFICANT CHANGE UP (ref 0–51)
WBC # BLD: 8.47 K/UL — SIGNIFICANT CHANGE UP (ref 3.8–10.5)
WBC # FLD AUTO: 8.47 K/UL — SIGNIFICANT CHANGE UP (ref 3.8–10.5)

## 2025-02-11 PROCEDURE — 85610 PROTHROMBIN TIME: CPT

## 2025-02-11 PROCEDURE — 93010 ELECTROCARDIOGRAM REPORT: CPT

## 2025-02-11 PROCEDURE — 0042T: CPT

## 2025-02-11 PROCEDURE — 96374 THER/PROPH/DIAG INJ IV PUSH: CPT | Mod: XU

## 2025-02-11 PROCEDURE — 80053 COMPREHEN METABOLIC PANEL: CPT

## 2025-02-11 PROCEDURE — 0042T: CPT | Mod: MC

## 2025-02-11 PROCEDURE — 85025 COMPLETE CBC W/AUTO DIFF WBC: CPT

## 2025-02-11 PROCEDURE — 93005 ELECTROCARDIOGRAM TRACING: CPT

## 2025-02-11 PROCEDURE — 99284 EMERGENCY DEPT VISIT MOD MDM: CPT

## 2025-02-11 PROCEDURE — 70496 CT ANGIOGRAPHY HEAD: CPT | Mod: MC

## 2025-02-11 PROCEDURE — 70498 CT ANGIOGRAPHY NECK: CPT | Mod: 26

## 2025-02-11 PROCEDURE — 99285 EMERGENCY DEPT VISIT HI MDM: CPT

## 2025-02-11 PROCEDURE — 36415 COLL VENOUS BLD VENIPUNCTURE: CPT

## 2025-02-11 PROCEDURE — 70498 CT ANGIOGRAPHY NECK: CPT | Mod: MC

## 2025-02-11 PROCEDURE — 99284 EMERGENCY DEPT VISIT MOD MDM: CPT | Mod: 25

## 2025-02-11 PROCEDURE — 85730 THROMBOPLASTIN TIME PARTIAL: CPT

## 2025-02-11 PROCEDURE — 70450 CT HEAD/BRAIN W/O DYE: CPT | Mod: 26,59

## 2025-02-11 PROCEDURE — 84484 ASSAY OF TROPONIN QUANT: CPT

## 2025-02-11 PROCEDURE — 70496 CT ANGIOGRAPHY HEAD: CPT | Mod: 26

## 2025-02-11 PROCEDURE — 70450 CT HEAD/BRAIN W/O DYE: CPT | Mod: MC

## 2025-02-11 PROCEDURE — 84702 CHORIONIC GONADOTROPIN TEST: CPT

## 2025-02-11 PROCEDURE — 96375 TX/PRO/DX INJ NEW DRUG ADDON: CPT | Mod: XU

## 2025-02-11 PROCEDURE — 82962 GLUCOSE BLOOD TEST: CPT

## 2025-02-11 RX ORDER — METOCLOPRAMIDE 10 MG/1
10 TABLET ORAL ONCE
Refills: 0 | Status: COMPLETED | OUTPATIENT
Start: 2025-02-11 | End: 2025-02-11

## 2025-02-11 RX ORDER — MAGNESIUM SULFATE 0.8 MEQ/ML
1 AMPUL (ML) INJECTION ONCE
Refills: 0 | Status: COMPLETED | OUTPATIENT
Start: 2025-02-11 | End: 2025-02-11

## 2025-02-11 RX ORDER — ACETAMINOPHEN 160 MG/5ML
1000 SUSPENSION ORAL ONCE
Refills: 0 | Status: COMPLETED | OUTPATIENT
Start: 2025-02-11 | End: 2025-02-11

## 2025-02-11 RX ADMIN — METOCLOPRAMIDE 10 MILLIGRAM(S): 10 TABLET ORAL at 21:31

## 2025-02-11 RX ADMIN — Medication 100 GRAM(S): at 23:08

## 2025-02-11 RX ADMIN — ACETAMINOPHEN 400 MILLIGRAM(S): 160 SUSPENSION ORAL at 21:31

## 2025-02-11 NOTE — ED ADULT TRIAGE NOTE - RESPIRATORY RATE (BREATHS/MIN)
Prescription approved per [facility] Refill Protocol.    Writer responded as per below.  NACHO Sanchez, RN          lisinopril (PRINIVIL/ZESTRIL) 5 MG tablet      Last Written Prescription Date: 3/16/17  Last Fill Quantity: 90, # refills: 2  Last Office Visit with [facility], [facility] or [facility] prescribing provider: 9/6/17 with Dr. Venegas       Potassium   Date Value Ref Range Status   05/22/2017 3.9 3.4 - 5.3 mmol/L Final     Creatinine   Date Value Ref Range Status   05/22/2017 0.82 0.66 - 1.25 mg/dL Final     BP Readings from Last 3 Encounters:   09/06/17 137/76   05/22/17 122/74   04/11/17 120/76       simvastatin (ZOCOR) 10 MG tablet     Last Written Prescription Date: 3/16/17  Last Fill Quantity: 90, # refills: 2  Last Office Visit with [facility], [facility] or [facility] prescribing provider: 9/6/17       Lab Results   Component Value Date    CHOL 101 01/23/2017     Lab Results   Component Value Date    HDL 33 01/23/2017     Lab Results   Component Value Date    LDL 55 01/23/2017     Lab Results   Component Value Date    TRIG 67 01/23/2017     No results found for: ARNULFO     18

## 2025-02-11 NOTE — ED PROVIDER NOTE - PATIENT PORTAL LINK FT
You can access the FollowMyHealth Patient Portal offered by [facility] by registering at the following website: [email or web address]. By joining [email or web address]’s FollowMyHealth portal, you will also be able to view your health information using other applications (apps) compatible with our system.

## 2025-02-11 NOTE — ED ADULT TRIAGE NOTE - CHIEF COMPLAINT QUOTE
pt states " I had a period of blurred vision lasting for 5 mins at 5:30. c/o headache and dizziness since. denies pmh or chest pain. code stroke called

## 2025-02-11 NOTE — ED PROVIDER NOTE - NSFOLLOWUPINSTRUCTIONS_ED_ALL_ED_FT
Please follow-up with the referred neurologist if your  symptoms return.  May take Tylenol 1000 mg every 6 hours and  Motrin 600 mg every 8 hours as needed for headache.  Please make sure to rest and hydrate well.  If you begin to experience any worsening vision changes, headache that does not go away, numbness, weakness, difficulty speaking, difficulty walking, any other acute symptoms or concerns, please return to the ED

## 2025-02-11 NOTE — ED PROVIDER NOTE - OBJECTIVE STATEMENT
34-year-old female patient with no past medical history presents the ED complaining blurred vision, numbness, tingling.  Patient states around 5:30 PM she was sitting down when she had sudden onset bilateral blurry vision/darkening of her vision, difficulty seeing with associated numbness and tingling in her hands and feet.  Patient states the symptoms lasted for a few minutes before dissipating, reports a headache behind her left eye associated with the symptoms.  Patient states that her vision is improved but still having headache behind her left eye, numbness and tingling in her hands and feet.  Denies any weakness, difficulty speaking, difficulty walking.  No history of similar symptoms in the past.  Does report history of headaches but no similar symptoms, no history of migraines.  Patient is not on any medications, does states she has been bradycardic in the past, no recent travel, no history of PE or DVT, patient does not smoke.  No family history of strokes.  Code stroke activated in the ED

## 2025-02-11 NOTE — ED ADULT NURSE REASSESSMENT NOTE - NS ED NURSE REASSESS COMMENT FT1
pt received from CC. report received from CC rn Chris. Pt A&OX4. resp even and unlabored on RA. sinus deborah on tele. NAD noted.

## 2025-02-11 NOTE — ED ADULT TRIAGE NOTE - AVIAN FLU SYMPTOMS
[facility]  [address] [place] [address],[address] [place] [address]  Dept: [phone]  Dept Fax: [phone]  Loc: [phone]    Vinita Gonzalez is a 32 y.o. female who presents today for her medical conditions/complaints as noted below. Vinita Gonzalez is c/o of Otalgia (Pt is having ear pain that started yesterday. Stated there is a little drainage )        HPI:     HPI   Chief Complaint   Patient presents with    Otalgia     Pt is having ear pain that started yesterday. Stated there is a little drainage      Patient presents today for evaluation of right ear pain that started yesterday. She states she could not sleep last night due to throbbing. She denies any recent illness with congestion, sinus pressure. She has not taken any medication for symptoms. No fever.      Past Medical History:   Diagnosis Date    Asthma     Thyroid dysfunction during puerperium, delivered during previous episode of care       Past Surgical History:   Procedure Laterality Date    MOUTH SURGERY         Vitals 7/12/2023 7/11/2023 6/28/2023 6/15/2023 5/24/2023 1/1/2446   SYSTOLIC 474 [id number] [phone]   DIASTOLIC 72 76 75 72 77 70   Site - - - - - -   Position - - - - - -   Cuff Size - - - - - -   Pulse - 111 96 - 94 -   Temp - 97.1 - - - -   Resp - - - - - -   SpO2 - 98 - - - -   Weight 163 lb 165 lb 170 lb 162 lb 154 lb 153 lb   Height - 5' 8\" - - - -   Body Mass Index - 25.09 kg/m2 - - - -   Pain Level - - - - - -       Family History   Problem Relation Age of Onset    Colon Cancer Maternal Grandmother 61    Coronary Art Dis Maternal Grandfather     COPD Paternal Grandmother     Coronary Art Dis Paternal Grandfather        Social History     Tobacco Use    Smoking status: Never    Smokeless tobacco: Never   Substance Use Topics    Alcohol use: No      Current Outpatient Medications on File Prior to Visit   Medication Sig Dispense Refill    ondansetron (ZOFRAN-ODT) 4 MG disintegrating tablet
No

## 2025-02-11 NOTE — ED PROVIDER NOTE - CLINICAL SUMMARY MEDICAL DECISION MAKING FREE TEXT BOX
34-year-old female patient with no past medical history presents the ED complaining of headache, vision changes, started at 5:30 PM, vision changes  improved after, numbness and tingling still persisted.  CAT scans negative, labs unremarkable, patient feeling better after medication.  No concern for acute central pathology at this time. 34-year-old female patient with no past medical history presents the ED complaining of headache, vision changes, started at 5:30 PM, vision changes  improved after, numbness and tingling still persisted.  CAT scans negative, labs unremarkable, patient feeling better after medication.  No concern for acute central pathology at this time.Ambulating without assistance, no focal deficits,, no weakness.  Will give neurology follow-up for possible atypical migraine, discharge home with strict return precautions,  return for worsening vision changes, headache, difficult speaking, difficulty walking, numbness, weakness, any other acute symptoms or concerns.  Patient agrees to plan of care 34-year-old female patient with no past medical history presents the ED complaining of headache, vision changes, started at 5:30 PM, vision changes  improved after, numbness and tingling still persisted.  CAT scans negative, labs unremarkable, patient feeling better after medication.  No concern for acute central pathology at this time.Ambulating without assistance, no focal deficits,, no weakness.  Will give neurology follow-up for possible atypical migraine, discharge home with strict return precautions,  return for worsening vision changes, headache, difficult speaking, difficulty walking, numbness, weakness, any other acute symptoms or concerns.  Patient agrees to plan of care  ---------  Ioana Hooper MD, Attending  34-year-old female patient with no past medical history pw acute onset loss of vision, headache, paresthesia at 17:30. Pt seen as code stroke 19:35 outside of CT scan. Decision for no TNK made at 19:40 due to low NIHSS and minimal suspicion for LVO. In the ED, pt states vision back to normal, HA and paresthesias still present. Denies recent trauma, stress, hx of headaches and migraines.   Gen: Well appearing in NAD   Head: NC/AT  Neck: trachea midline  Resp:  No distress  Ext: no deformities  Neuro:  A&O appears non focal  Skin:  Warm and dry as visualized  Psych:  anxious  ddx includes, but is not limited to the following: TIA, stroke, vertebral artery dissection. complex migraine.  plan: CT code stroke, headache cocktail, reassess for disposition.   update: pt well appearing, back to baseline, discussed unremarkable imaging and need for neuro follow up for complex headache. Pt agrees

## 2025-02-11 NOTE — ED PROVIDER NOTE - PHYSICAL EXAMINATION
Const: Pt is well appearing. Awake, alert and oriented to person, place, & time. In no acute distress.   HEENT: NC/AT. TM's clear bilaterally. Oropharynx clear without exudates or erythema. Moist mucous membranes  Eyes: PERRLA. EOMI, visual fields intact   Neck:. Soft and supple. Full ROM without pain. No cervical midline tenderness.   Cardiac: Regular rate and regular rhythm. +S1/S2. No murmurs, rubs or gallops. Peripheral pulses 2+ and symmetric. No LE edema.  Resp: Speaking in full sentences, breath sounds equal and clear bilaterally. No wheezes, rales or rhonchi.  Abd: Soft, non-tender, non-distended.  No guarding or rebound.  MSK: Spine midline and non-tender. No CVAT.  Skin: No rashes, abrasions or lacerations.  Neuro: A&Ox3, moving all extremities symmetrically, follows commands, motor ervin upper and lower ext 5/5, sensory symm and intact CN 2-12 grossly intact, no ataxia, no nystagmus, no dysmetria, no ddk, symm ervin, no pronator drift

## 2025-02-11 NOTE — ED PROVIDER NOTE - CARE PROVIDER_API CALL
[facility]  Neurology  [address] [facility], [address]  [place] [address]  Phone: [phone]  Fax: [phone]  Follow Up Time: Urgent

## 2025-02-11 NOTE — ED PROVIDER NOTE - ADDITIONAL NOTES AND INSTRUCTIONS:
To whom it may concern,     This  patient was evaluated at [facility], and was deemed to require time off from work/school. They may return on the mentioned date below. If you have any questions or concerns, you may contact the emergency department at [facility].  Thank you.     Dr. William Wiedemann, DO

## 2025-02-11 NOTE — ED PROVIDER NOTE - ATTENDING CONTRIBUTION TO CARE
Dr. Hooper: I personally saw the patient with the resident and performed a substantive portion of the visit. I performed a face to face bedside interview with patient regarding history of present illness, review of symptoms and past medical history. I completed an independent physical exam and all aspects of medical decision making. I have discussed patient's plan of care with resident. I agree with note as stated above, having amended the EMR as needed to reflect my findings. This includes HISTORY OF PRESENT ILLNESS, HIV, PAST MEDICAL/SURGICAL/FAMILY/SOCIAL HISTORY, ALLERGIES AND HOME MEDICATIONS, ROS, PHYSICAL EXAM, MEDICAL DECISION MAKING and any PROGRESS NOTES during the time I functioned as the attending physician for this patient.  SEE MDM

## 2025-02-11 NOTE — ED ADULT NURSE NOTE - OBJECTIVE STATEMENT
pt comes into ED A&Ox4, stating that she had a period of blurry vision around 1730hrs earlier today. states currently has a headache. neuro check noted no deficits. pt states currently has some lightheadedness, states hx of bradycardia but denies other medical hx. pt denies chest pain and denies other pmhx. pt breathing even and unlabored on room air. code stroke called and pt brought to CT scan. no other complaints of pain or discomfort at this time

## 2025-06-05 NOTE — ED ADULT TRIAGE NOTE - RESPIRATORY RATE (BREATHS/MIN)
Problem: Pain  Goal: Verbalizes/displays adequate comfort level or baseline comfort level  Note: Assess the location, characteristics, onset, duration, frequency, quality, and severity of pain. Encourage immediate report of pain. Use appropriate pain scale to rate pain. Manage pain using nonpharmacologic/pharmacologic interventions.      
20

## 2025-07-22 ENCOUNTER — APPOINTMENT (OUTPATIENT)
Dept: OBGYN | Facility: CLINIC | Age: 35
End: 2025-07-22
Payer: COMMERCIAL

## 2025-07-22 VITALS
SYSTOLIC BLOOD PRESSURE: 115 MMHG | HEIGHT: 63 IN | DIASTOLIC BLOOD PRESSURE: 70 MMHG | WEIGHT: 186 LBS | BODY MASS INDEX: 32.96 KG/M2

## 2025-07-22 DIAGNOSIS — N39.3 STRESS INCONTINENCE (FEMALE) (MALE): ICD-10-CM

## 2025-07-22 DIAGNOSIS — Z01.419 ENCOUNTER FOR GYNECOLOGICAL EXAMINATION (GENERAL) (ROUTINE) W/OUT ABNORMAL FINDINGS: ICD-10-CM

## 2025-07-22 PROCEDURE — 36415 COLL VENOUS BLD VENIPUNCTURE: CPT

## 2025-07-22 PROCEDURE — 99214 OFFICE O/P EST MOD 30 MIN: CPT | Mod: 25

## 2025-07-22 PROCEDURE — 99395 PREV VISIT EST AGE 18-39: CPT

## 2025-07-22 PROCEDURE — 99459 PELVIC EXAMINATION: CPT | Mod: NC

## 2025-07-25 LAB
C TRACH RRNA SPEC QL NAA+PROBE: NOT DETECTED
HAV IGM SER QL: NONREACTIVE
HBV CORE IGM SER QL: NONREACTIVE
HBV SURFACE AG SER QL: NONREACTIVE
HCV AB SER QL: NONREACTIVE
HCV S/CO RATIO: 0.1 S/CO
HIV1+2 AB SPEC QL IA.RAPID: NONREACTIVE
HPV HIGH+LOW RISK DNA PNL CVX: NOT DETECTED
N GONORRHOEA RRNA SPEC QL NAA+PROBE: NOT DETECTED
SOURCE TP AMPLIFICATION: NORMAL
T PALLIDUM AB SER QL IA: NEGATIVE

## 2025-08-10 LAB — CYTOLOGY CVX/VAG DOC THIN PREP: NORMAL

## 2025-09-05 ENCOUNTER — NON-APPOINTMENT (OUTPATIENT)
Age: 35
End: 2025-09-05